# Patient Record
Sex: MALE | Race: WHITE | Employment: OTHER | URBAN - METROPOLITAN AREA
[De-identification: names, ages, dates, MRNs, and addresses within clinical notes are randomized per-mention and may not be internally consistent; named-entity substitution may affect disease eponyms.]

---

## 2019-04-02 ENCOUNTER — TRANSFERRED RECORDS (OUTPATIENT)
Dept: HEALTH INFORMATION MANAGEMENT | Facility: CLINIC | Age: 69
End: 2019-04-02

## 2019-04-03 ENCOUNTER — TRANSFERRED RECORDS (OUTPATIENT)
Dept: HEALTH INFORMATION MANAGEMENT | Facility: CLINIC | Age: 69
End: 2019-04-03

## 2019-04-04 ENCOUNTER — TRANSFERRED RECORDS (OUTPATIENT)
Dept: HEALTH INFORMATION MANAGEMENT | Facility: CLINIC | Age: 69
End: 2019-04-04

## 2019-04-05 ENCOUNTER — TRANSFERRED RECORDS (OUTPATIENT)
Dept: HEALTH INFORMATION MANAGEMENT | Facility: CLINIC | Age: 69
End: 2019-04-05

## 2019-04-05 ENCOUNTER — HOSPITAL ENCOUNTER (INPATIENT)
Facility: CLINIC | Age: 69
LOS: 2 days | Discharge: HOME | DRG: 433 | End: 2019-04-07
Attending: INTERNAL MEDICINE | Admitting: INTERNAL MEDICINE
Payer: COMMERCIAL

## 2019-04-05 DIAGNOSIS — K92.0 GASTROINTESTINAL HEMORRHAGE WITH HEMATEMESIS: Primary | ICD-10-CM

## 2019-04-05 PROBLEM — K92.2 GIB (GASTROINTESTINAL BLEEDING): Status: ACTIVE | Noted: 2019-04-05

## 2019-04-05 LAB
ABO + RH BLD: NORMAL
ABO + RH BLD: NORMAL
ALT SERPL-CCNC: 38 IU/L (ref 12–68)
AST SERPL-CCNC: 38 IU/L (ref 12–37)
BLD GP AB SCN SERPL QL: NORMAL
BLOOD BANK CMNT PATIENT-IMP: NORMAL
CREAT SERPL-MCNC: 1.14 MG/DL (ref 0.7–1.3)
GFR SERPL CREATININE-BSD FRML MDRD: >60 ML/MIN/1.73M2
GLUCOSE SERPL-MCNC: 106 MG/DL (ref 74–106)
HGB BLD-MCNC: 7.6 G/DL (ref 13.3–17.7)
INR PPP: 1.5 (ref 1–1)
POTASSIUM SERPL-SCNC: 3.5 MMOL/L (ref 3.5–5.1)
SPECIMEN EXP DATE BLD: NORMAL

## 2019-04-05 PROCEDURE — 86850 RBC ANTIBODY SCREEN: CPT

## 2019-04-05 PROCEDURE — 25800030 ZZH RX IP 258 OP 636

## 2019-04-05 PROCEDURE — 86900 BLOOD TYPING SEROLOGIC ABO: CPT

## 2019-04-05 PROCEDURE — 12000004 ZZH R&B IMCU UMMC

## 2019-04-05 PROCEDURE — C9113 INJ PANTOPRAZOLE SODIUM, VIA: HCPCS

## 2019-04-05 PROCEDURE — 99223 1ST HOSP IP/OBS HIGH 75: CPT | Mod: AI | Performed by: INTERNAL MEDICINE

## 2019-04-05 PROCEDURE — 86901 BLOOD TYPING SEROLOGIC RH(D): CPT

## 2019-04-05 PROCEDURE — 36415 COLL VENOUS BLD VENIPUNCTURE: CPT

## 2019-04-05 PROCEDURE — 85018 HEMOGLOBIN: CPT

## 2019-04-05 PROCEDURE — 25000128 H RX IP 250 OP 636

## 2019-04-05 PROCEDURE — 40000556 ZZH STATISTIC PERIPHERAL IV START W US GUIDANCE

## 2019-04-05 PROCEDURE — 25000132 ZZH RX MED GY IP 250 OP 250 PS 637

## 2019-04-05 RX ORDER — LIDOCAINE 40 MG/G
CREAM TOPICAL
Status: DISCONTINUED | OUTPATIENT
Start: 2019-04-05 | End: 2019-04-07 | Stop reason: HOSPADM

## 2019-04-05 RX ORDER — NALOXONE HYDROCHLORIDE 0.4 MG/ML
.1-.4 INJECTION, SOLUTION INTRAMUSCULAR; INTRAVENOUS; SUBCUTANEOUS
Status: DISCONTINUED | OUTPATIENT
Start: 2019-04-05 | End: 2019-04-07 | Stop reason: HOSPADM

## 2019-04-05 RX ORDER — ACETAMINOPHEN 325 MG/1
325 TABLET ORAL EVERY 6 HOURS PRN
Status: DISCONTINUED | OUTPATIENT
Start: 2019-04-05 | End: 2019-04-07 | Stop reason: HOSPADM

## 2019-04-05 RX ORDER — PROPRANOLOL HYDROCHLORIDE 10 MG/1
10 TABLET ORAL 2 TIMES DAILY
Status: DISCONTINUED | OUTPATIENT
Start: 2019-04-05 | End: 2019-04-07 | Stop reason: HOSPADM

## 2019-04-05 RX ORDER — ALBUTEROL SULFATE 90 UG/1
2 AEROSOL, METERED RESPIRATORY (INHALATION) EVERY 4 HOURS PRN
Status: DISCONTINUED | OUTPATIENT
Start: 2019-04-05 | End: 2019-04-07 | Stop reason: HOSPADM

## 2019-04-05 RX ORDER — CIPROFLOXACIN 2 MG/ML
400 INJECTION, SOLUTION INTRAVENOUS EVERY 12 HOURS
Status: DISCONTINUED | OUTPATIENT
Start: 2019-04-05 | End: 2019-04-06

## 2019-04-05 RX ADMIN — PROPRANOLOL HYDROCHLORIDE 10 MG: 10 TABLET ORAL at 21:06

## 2019-04-05 RX ADMIN — PANTOPRAZOLE SODIUM 40 MG: 40 INJECTION, POWDER, FOR SOLUTION INTRAVENOUS at 21:06

## 2019-04-05 RX ADMIN — OCTREOTIDE ACETATE 50 MCG/HR: 200 INJECTION, SOLUTION INTRAVENOUS; SUBCUTANEOUS at 23:12

## 2019-04-05 SDOH — HEALTH STABILITY: MENTAL HEALTH: HOW MANY STANDARD DRINKS CONTAINING ALCOHOL DO YOU HAVE ON A TYPICAL DAY?: 3 OR 4

## 2019-04-05 SDOH — HEALTH STABILITY: MENTAL HEALTH: HOW OFTEN DO YOU HAVE A DRINK CONTAINING ALCOHOL?: 4 OR MORE TIMES A WEEK

## 2019-04-05 ASSESSMENT — ACTIVITIES OF DAILY LIVING (ADL)
TRANSFERRING: 0-->INDEPENDENT
COGNITION: 0 - NO COGNITION ISSUES REPORTED
ADLS_ACUITY_SCORE: 19
AMBULATION: 0-->INDEPENDENT
RETIRED_COMMUNICATION: 0-->UNDERSTANDS/COMMUNICATES WITHOUT DIFFICULTY
BATHING: 0-->INDEPENDENT
SWALLOWING: 0-->SWALLOWS FOODS/LIQUIDS WITHOUT DIFFICULTY
TOILETING: 0-->INDEPENDENT
FALL_HISTORY_WITHIN_LAST_SIX_MONTHS: NO
RETIRED_EATING: 0-->INDEPENDENT
DRESS: 0-->INDEPENDENT

## 2019-04-05 ASSESSMENT — MIFFLIN-ST. JEOR: SCORE: 1692.07

## 2019-04-05 NOTE — PROGRESS NOTES
Admission          4/5/2019  6:35 PM  -----------------------------------------------------------  Reason for admission: GI bleed  Primary team notified of pt arrival.  Admitted from: Outside hospital   Via: stretcher  Accompanied by: family  Belongings: Placed in closet; valuables sent home with family  Admission Profile: complete  Teaching: orientation to unit and call light- call light within reach, call don't fall, use of console, meal times, when to call for the RN, and enforced importance of safety   Access: PIV  Telemetry:Placed on pt  Ht./Wt.: complete  2 RN Skin Assessment Completed By: AT and NL  Pt status: Full - VSS - A&Ox4    Temp:  [98.2  F (36.8  C)] 98.2  F (36.8  C)  Heart Rate:  [69] 69  Resp:  [16] 16  BP: (161)/(90) 161/90  SpO2:  [98 %] 98 %

## 2019-04-05 NOTE — PROGRESS NOTES
[facility]  Transfer Triage Note    Date of call: 04/05/19  Time of call: 12:06 PM    Reason for Transfer:Procedure can be done here and not at referring hospital  Diagnosis: variceal bleed    Outside Records: Available  Additional records requested to be faxed to [phone].    Stability of Patient: Patient is at risk for instability, however patient requires urgent transfer and does not meet ICU criteria     Expected Time of Arrival for Transfer: 0-8 hours    Recommendations for Management and Stabilization: Not needed    Additional Comments 68M currently admitted to [facility] with GIB. Hx of ETOH cirrhosis, had dark tarry stool, Hb10->6 EGD there showed eso and Gastoric varices w recent sign of bleed. Was unable to successfully treat and IR there attempted embolization. However, found to have splenic v occlusion and could not do it. Surgery there was consulted for possible splenectomy but felt to be too high risk. They request transfer for advanced care such as glue or coiling.  Hemodynamically much more stable now.     Dr Rueda from hepatology was on the call and agreed w transfer.  [facility] bed requested    Alycia Gomez MD

## 2019-04-06 ENCOUNTER — APPOINTMENT (OUTPATIENT)
Dept: ULTRASOUND IMAGING | Facility: CLINIC | Age: 69
DRG: 433 | End: 2019-04-06
Attending: INTERNAL MEDICINE
Payer: COMMERCIAL

## 2019-04-06 LAB
ALBUMIN SERPL-MCNC: 2.7 G/DL (ref 3.4–5)
ALBUMIN SERPL-MCNC: 2.9 G/DL (ref 3.4–5)
ALP SERPL-CCNC: 64 U/L (ref 40–150)
ALP SERPL-CCNC: 68 U/L (ref 40–150)
ALT SERPL W P-5'-P-CCNC: 32 U/L (ref 0–70)
ALT SERPL W P-5'-P-CCNC: 32 U/L (ref 0–70)
ANION GAP SERPL CALCULATED.3IONS-SCNC: 7 MMOL/L (ref 3–14)
ANION GAP SERPL CALCULATED.3IONS-SCNC: 8 MMOL/L (ref 3–14)
AST SERPL W P-5'-P-CCNC: 30 U/L (ref 0–45)
AST SERPL W P-5'-P-CCNC: 32 U/L (ref 0–45)
BILIRUB SERPL-MCNC: 2.1 MG/DL (ref 0.2–1.3)
BILIRUB SERPL-MCNC: 2.4 MG/DL (ref 0.2–1.3)
BUN SERPL-MCNC: 12 MG/DL (ref 7–30)
BUN SERPL-MCNC: 13 MG/DL (ref 7–30)
CALCIUM SERPL-MCNC: 7.5 MG/DL (ref 8.5–10.1)
CALCIUM SERPL-MCNC: 7.8 MG/DL (ref 8.5–10.1)
CHLORIDE SERPL-SCNC: 108 MMOL/L (ref 94–109)
CHLORIDE SERPL-SCNC: 109 MMOL/L (ref 94–109)
CO2 SERPL-SCNC: 20 MMOL/L (ref 20–32)
CO2 SERPL-SCNC: 22 MMOL/L (ref 20–32)
CREAT SERPL-MCNC: 1.06 MG/DL (ref 0.66–1.25)
CREAT SERPL-MCNC: 1.1 MG/DL (ref 0.66–1.25)
ERYTHROCYTE [DISTWIDTH] IN BLOOD BY AUTOMATED COUNT: 15.9 % (ref 10–15)
ERYTHROCYTE [DISTWIDTH] IN BLOOD BY AUTOMATED COUNT: 16.3 % (ref 10–15)
FIBRINOGEN PPP-MCNC: 239 MG/DL (ref 200–420)
GFR SERPL CREATININE-BSD FRML MDRD: 69 ML/MIN/{1.73_M2}
GFR SERPL CREATININE-BSD FRML MDRD: 72 ML/MIN/{1.73_M2}
GLUCOSE SERPL-MCNC: 101 MG/DL (ref 70–99)
GLUCOSE SERPL-MCNC: 99 MG/DL (ref 70–99)
HCT VFR BLD AUTO: 23.5 % (ref 40–53)
HCT VFR BLD AUTO: 26.2 % (ref 40–53)
HGB BLD-MCNC: 7.2 G/DL (ref 13.3–17.7)
HGB BLD-MCNC: 7.7 G/DL (ref 13.3–17.7)
HGB BLD-MCNC: 7.8 G/DL (ref 13.3–17.7)
HGB BLD-MCNC: 8.1 G/DL (ref 13.3–17.7)
INR PPP: 1.46 (ref 0.86–1.14)
INR PPP: 1.52 (ref 0.86–1.14)
MCH RBC QN AUTO: 30.1 PG (ref 26.5–33)
MCH RBC QN AUTO: 30.2 PG (ref 26.5–33)
MCHC RBC AUTO-ENTMCNC: 30.6 G/DL (ref 31.5–36.5)
MCHC RBC AUTO-ENTMCNC: 30.9 G/DL (ref 31.5–36.5)
MCV RBC AUTO: 98 FL (ref 78–100)
MCV RBC AUTO: 98 FL (ref 78–100)
PLATELET # BLD AUTO: 38 10E9/L (ref 150–450)
PLATELET # BLD AUTO: 48 10E9/L (ref 150–450)
POTASSIUM SERPL-SCNC: 3.6 MMOL/L (ref 3.4–5.3)
POTASSIUM SERPL-SCNC: 3.7 MMOL/L (ref 3.4–5.3)
PROT SERPL-MCNC: 5.5 G/DL (ref 6.8–8.8)
PROT SERPL-MCNC: 5.9 G/DL (ref 6.8–8.8)
RBC # BLD AUTO: 2.39 10E12/L (ref 4.4–5.9)
RBC # BLD AUTO: 2.68 10E12/L (ref 4.4–5.9)
SODIUM SERPL-SCNC: 137 MMOL/L (ref 133–144)
SODIUM SERPL-SCNC: 138 MMOL/L (ref 133–144)
WBC # BLD AUTO: 5 10E9/L (ref 4–11)
WBC # BLD AUTO: 6 10E9/L (ref 4–11)

## 2019-04-06 PROCEDURE — 25000128 H RX IP 250 OP 636

## 2019-04-06 PROCEDURE — 25000128 H RX IP 250 OP 636: Performed by: STUDENT IN AN ORGANIZED HEALTH CARE EDUCATION/TRAINING PROGRAM

## 2019-04-06 PROCEDURE — 25000128 H RX IP 250 OP 636: Performed by: INTERNAL MEDICINE

## 2019-04-06 PROCEDURE — 85610 PROTHROMBIN TIME: CPT | Performed by: INTERNAL MEDICINE

## 2019-04-06 PROCEDURE — 85027 COMPLETE CBC AUTOMATED: CPT

## 2019-04-06 PROCEDURE — 99233 SBSQ HOSP IP/OBS HIGH 50: CPT | Performed by: HOSPITALIST

## 2019-04-06 PROCEDURE — 36415 COLL VENOUS BLD VENIPUNCTURE: CPT | Performed by: INTERNAL MEDICINE

## 2019-04-06 PROCEDURE — 85018 HEMOGLOBIN: CPT | Performed by: STUDENT IN AN ORGANIZED HEALTH CARE EDUCATION/TRAINING PROGRAM

## 2019-04-06 PROCEDURE — 25000132 ZZH RX MED GY IP 250 OP 250 PS 637

## 2019-04-06 PROCEDURE — 85384 FIBRINOGEN ACTIVITY: CPT

## 2019-04-06 PROCEDURE — 36415 COLL VENOUS BLD VENIPUNCTURE: CPT | Performed by: STUDENT IN AN ORGANIZED HEALTH CARE EDUCATION/TRAINING PROGRAM

## 2019-04-06 PROCEDURE — 36415 COLL VENOUS BLD VENIPUNCTURE: CPT

## 2019-04-06 PROCEDURE — 76705 ECHO EXAM OF ABDOMEN: CPT

## 2019-04-06 PROCEDURE — C9113 INJ PANTOPRAZOLE SODIUM, VIA: HCPCS

## 2019-04-06 PROCEDURE — 85610 PROTHROMBIN TIME: CPT

## 2019-04-06 PROCEDURE — 80053 COMPREHEN METABOLIC PANEL: CPT

## 2019-04-06 PROCEDURE — 12000004 ZZH R&B IMCU UMMC

## 2019-04-06 PROCEDURE — 85027 COMPLETE CBC AUTOMATED: CPT | Performed by: INTERNAL MEDICINE

## 2019-04-06 PROCEDURE — 25800030 ZZH RX IP 258 OP 636: Performed by: INTERNAL MEDICINE

## 2019-04-06 PROCEDURE — 80053 COMPREHEN METABOLIC PANEL: CPT | Performed by: INTERNAL MEDICINE

## 2019-04-06 RX ORDER — PROPRANOLOL HYDROCHLORIDE 10 MG/1
10 TABLET ORAL 2 TIMES DAILY
Qty: 60 TABLET | Refills: 3 | Status: CANCELLED | OUTPATIENT
Start: 2019-04-06

## 2019-04-06 RX ORDER — PANTOPRAZOLE SODIUM 40 MG/1
40 TABLET, DELAYED RELEASE ORAL
Qty: 30 TABLET | Refills: 3 | Status: CANCELLED | OUTPATIENT
Start: 2019-04-07

## 2019-04-06 RX ORDER — CEFTRIAXONE 1 G/1
1 INJECTION, POWDER, FOR SOLUTION INTRAMUSCULAR; INTRAVENOUS EVERY 24 HOURS
Status: DISCONTINUED | OUTPATIENT
Start: 2019-04-06 | End: 2019-04-07 | Stop reason: HOSPADM

## 2019-04-06 RX ORDER — PANTOPRAZOLE SODIUM 40 MG/1
40 TABLET, DELAYED RELEASE ORAL
Status: DISCONTINUED | OUTPATIENT
Start: 2019-04-07 | End: 2019-04-07 | Stop reason: HOSPADM

## 2019-04-06 RX ADMIN — PROPRANOLOL HYDROCHLORIDE 10 MG: 10 TABLET ORAL at 08:25

## 2019-04-06 RX ADMIN — CIPROFLOXACIN 400 MG: 2 INJECTION, SOLUTION INTRAVENOUS at 00:13

## 2019-04-06 RX ADMIN — CEFTRIAXONE 1 G: 1 INJECTION, POWDER, FOR SOLUTION INTRAMUSCULAR; INTRAVENOUS at 14:28

## 2019-04-06 RX ADMIN — PHYTONADIONE 10 MG: 10 INJECTION, EMULSION INTRAMUSCULAR; INTRAVENOUS; SUBCUTANEOUS at 02:46

## 2019-04-06 RX ADMIN — PANTOPRAZOLE SODIUM 40 MG: 40 INJECTION, POWDER, FOR SOLUTION INTRAVENOUS at 08:24

## 2019-04-06 RX ADMIN — PROPRANOLOL HYDROCHLORIDE 10 MG: 10 TABLET ORAL at 20:25

## 2019-04-06 ASSESSMENT — ACTIVITIES OF DAILY LIVING (ADL)
ADLS_ACUITY_SCORE: 10

## 2019-04-06 ASSESSMENT — MIFFLIN-ST. JEOR: SCORE: 1688.44

## 2019-04-06 NOTE — H&P
[facility] [place]  History and Physical - [employer] [facility]   Date of Admission:  4/5/2019    Assessment & Plan   Varghese Whitfield is a 68 year old male admitted on 4/5/2019. He has a PMH Asthma, HTN, EtOH Cirrhosis w/ gastric varicies, barrents esophagus, hx GIB in 2013, ongoing EtOH use and is transferred here for advanced endoscopic intervention for gastric variceal bleed.      Upper GIB:   Splenomegaly/splenic vein occlusion:  Cirrhosis presumed 2/2 EtOH: MELD 22 on 4/1, decreased to 15 prior to transfer.    c/b portal HTN, varices, trace ascites, coagulopathy.  Has history of upper GIB in 2013.  At OSH was started on appropriate treatment.  Had EGD with gastric and esophageal varices and stigmata of bleeding, but no active bleed.  Now transferred here for planned endoscopic intervention.  Hemodynamically stable. Hgb prior to transfer 8.4.  Will trend and monitor closely while awaiting GI consult.    - GI consult placed    Clear liquid diet    NPO midnight  - Continue IV PPI, octreotide, ciprofloxacin (has penicillin allergy, generalized rash, no breathing issues)  - Type and screen ordered  - Maintain 2 large bore PIVs  - Consented for blood  - Trending hgb Q6 overnight  - Transfuse for HGB <7  - Daily meld labs    UPDATE: repeat hgb 7.6 and platelets 28 per nursing (not yet updated in chart).    - Will trend HGB and platelets.    - Ordered fibrinogen and INR  - If concerned for bleeding or DIC will need tranfusion  - With increasing abd distention and above labs will repeat CMP and ordered RUQ us with doppler to eval for new clot    Ongoing EtOH use:  Possible hx withdrawal seizure:  Last drink 4/1.  Drinks heavily daily.  He denies history of withdrawal but as it is noted in the chart will monitor closely, although he is nearing the end of the window during which I would be most concerned.  CIWA negative at OSH 4/2-5.    - CIWA  - Defer ordering meds at this time, call MD if  concerned     Lower extremity edema: Suspect degree of volume overload with fluids at OSH and holding PTA diuretic.    - Continue to hold PTA diuretic pending repeat lab and hemodynamic stability  - Would plan to resume soon    Paroxysmal Afib at OSH: [id number] 2 for age and HTN.  Returned to NSR prior to transfer.  And trop was trended to peak, felt to have been likely due to demand.  No chest pain or SOB currently.    - Obtain repeat ECG if Afib  - Telemetry    SRIDHAR: Resolved.  Creatine 1.44 at admission on 4/2, has now normalized as of 4/5 am.  - Repeat labs in the AM    Lactate elevation: Lactate elevated on admission 4/1, decreased with fluids.   - NTD    Chronic Issues:  HTN: Holding hydrochlorothiazide/lisinopril in setting of recent SRIDHAR and GIB  Asthma: PTA PRN inhaler available  Peripheral Neuropathy?: Holding PTA gabapentin     Diet: NPO for Medical/Clinical Reasons Except for: Meds, Ice Chips    Fluids: None at this time  DVT Prophylaxis: SCDs  Montez Catheter: not present  Code Status: DNR/DNI      Disposition Plan   Expected discharge: 2 - 3 days, recommended to prior living arrangement once hemoglobin stable.  Entered: Dave Livingston MD 04/05/2019, 9:05 PM       The patient's care was discussed with the Attending Physician, Dr. Thomas.    Dave Livingston MD  [facility], [place]  Pager: [phone]  Please see sticky note for cross cover information  ______________________________________________________________________    Chief Complaint   GIB.  Transferred for advanced endoscopic intervention.      History is obtained from the patient    History of Present Illness   Varghese Whitfield is a 68 year old male with PMHx Asthma, HTN, EtOH Cirrhosis w/ gastric varicies, barrents esophagus, hx GIB in 2013, ongoing EtOH use. He is on propanolol and protonix.     Who presents on transfer from OSH for planned advanced endoscopic intervention for bleeding  gastric varices.      Presented to OSH 4/2 with CC hematemesis and melena:     Monday 4/1 he felt nauseated and had several episodes of hematemesis.  Had been drinking heavily over the weekend.  Blood was clotted with adonay blood mixed in and he is not exactly sure how much there was but it seemed like it was increasing.  He had been noted intermittent dark stools leading up to this but not black or frankly bloody stools.  He does not remember feeling short of breath or lightheaded.    He presented to the ER and was noted to by borderline hypotensive and tachycardic.  Initial labs were notable for WBC 10.9, HGB 10. 5, platelet 99.  Had MELD 23 with , creatine 1.44, TBili 3.2, INR 1.6.  Had elevated lactate 4.9 which down trended with fluids.    Troponin elevated on admission to 0.048, peaked at 0.1 and though to have been due to demand.  Additionally he complained of palpitations and was noted to be in Afib with RVR ([id number]).       He was given vitamin K, started on IV PPI, and octreotide, and ciprofloxacin for SBP prophylaxis and admitted for further intervention.  Hgb dropped to 6.5 4/2, with continue hematemesis and melena.  Had EGD 4/2 which showed small proximal esophageal and gastric varices with stigmata of recent bleeding, but no active bleed.  IR was consulted and deemed he was not candidate for balloon-occluded retrograde transvenous obliteration due to splenic vein occlusion.      Prior to transfer hgb was 8.4.      On arrival he says he feels nearly back to normal with no fevers/chills, shortness of breath, lightheadedness, abd pain, nausea/vomiting, or melena/hematochezia today.  Would like to eat dinner.  Does note pitting edema at the ankles bilaterally, which he does not think is normal for him.      This has happened once in the past in 2013 and he is on propanolol and protonix and [facility].  Has had colonoscopy several years ago, he  thinks it was clean.  Denies NSAID use (although has indomethacin and diclofenac gel on med list, does not think he is using them), Not on anticoagulation.  Tries to stay sober, but frequently relapses.  Had been drinking as much as 1-2 boxes of wine per day.  Last drink 5PM 3/31.  On my exam he denies history of withdrawal but there are notes which mention a remote withdrawal seizure.  He say they go to [place] annually and he does not drink for 2 months without issue.      Review of Systems    The 10 point Review of Systems is negative other than noted in the HPI or here.     Past Medical History    I have reviewed this patient's medical history and updated it with pertinent information if needed.   Past Medical History:   Diagnosis Date     Asthma      Cirrhosis with alcoholism (H)      HTN (hypertension)    Does not think he has ever had pancreatitis      Past Surgical History   I have reviewed this patient's surgical history and updated it with pertinent information if needed.  Past Surgical History:   Procedure Laterality Date     septum      repaired for deviation      Social History   I have reviewed this patient's social history and updated it with pertinent information if needed. Varghese Whitfield  reports that  has never smoked. He does not have any smokeless tobacco history on file. He reports that he drinks alcohol. He reports that he does not use drugs.    Family History   I have reviewed this patient's family history and updated it with pertinent information if needed.   Family History   Problem Relation Age of Onset     Myocardial Infarction Mother      Myocardial Infarction Father      Alcoholism Father      No Known Problems Sister         History of hemochomatosis, a1at def, or other liver disease     Prior to Admission Medications   None     Allergies   No Known Allergies    Physical Exam   Vital Signs: Temp: 98.2  F (36.8  C) Temp src: Oral BP: 159/85   Heart Rate: 69 Resp: 16 SpO2: 98 % O2 Device:  None (Room air)    Weight: 200 lbs 12.8 oz    GEN: Well nourished, well developed, appears stated age  HEENT: PERRL, no conjunctival injection, mild icterus and jaundice, Neck supple without meningismus, no anterior cervical or supraclavicular LAD  CV: RRR, Warm, well-perfused extremities, no JVD  RESP: CTAB, although decreased at the bases, no crackles, normal WOB  GI: Mild gynecomastia.  Abd soft, non-tender, distended, + shifting dullness, splenomegaly with sleep palpable 5 cm below costal margin.    MSK: No gross deformities appreciated, no pitting edema to the knees bilaterally, decreased hair on th legs.    Skin: Warm, dry. No rashes/lesions  Neuro: Alert, CNs II-XII grossly intact. Sensation and motor function of extremities grossly intact.   Psych: Appropriate mood and affect.    Data   Data reviewed today: I reviewed all medications, new labs and imaging results over the last 24 hours.

## 2019-04-06 NOTE — CONSULTS
INTERVENTIONAL RADIOLOGY CONSULT    Varghese Whitfield is a 68 year old male admitted on 4/5/2019 for gastric variceal bleeding. We were consulted for TIPS placement. We went up to the floor and saw the patient at bedside.     He has a PMH Asthma, HTN, EtOH Cirrhosis w/ gastric varicies. He was transferred here from [facility] for GI bleeding.     He gets his care from [facility]. He does  Not have a hepatologist he sees. He is currently hemodynamically stable.      The patient's MELD score is 15. He is therefore a suitable candidate for a TIPS procedure.     I discussed with the patient what a TIPS procedure is and showed them pictures of the stent and the way we join a hepatic and portal vein. I explained why the procedure is performed and how it decompresses the portal venous system. We discussed the risks of the procedure, which include but are not limited to infection such as liver abscess, biloma formation, damage to nearby vessels or bile ducts, bleeding, failure of the procedure, stent maldeployment, liver failure, right heart failure, and encephalopathy.     I discussed that the procedure will be performed under general anaesthesia.     I explained she will be followed with regular TIPS ultrasounds due to the risk of narrowing or thrombosis within the stent over time.     I informed the patient that I will be assisted during the procedure and that this likely will include a resident and/or fellow. All of the patient's questions were answered.    He prefers TIPS to be done as outpatient. We will see the patient in clinic after he discharges. We will also have see PACS clinic and also transfer his [facility] from [facility] to our PACS.     Davian Quiñones MD  Interventional Radiology Fellow  [phone] after hours    I, Dr Gabe Wei, was present with the fellow during the history and exam. I discussed the case with the fellow and agree with the findings as documented in the assessment and  plan.    Gabe Wei MD

## 2019-04-06 NOTE — PROGRESS NOTES
Event Note:    Patient stated he wished to leave AMA.  Was frustrated with continued testing and perceived delay in intervention.  He granted me permission to call his wife and she talked him into staying to hear what GI has to say in the AM.      While this was happening labs returned with interval hgb drop and decreased platelets.  Further enforcing the importance of staying for close monitoring.  Patient agreed to stay and amiable with staff.  Apologized for his previous frustration.

## 2019-04-06 NOTE — PROGRESS NOTES
Time of notification: 1:36 PM  Provider notified: Karli Artis [id number]  Patient status:Checking if you/pharmacy want Rocephin administered? (penicillin allergy HX)  Temp:  [97.7  F (36.5  C)-98.4  F (36.9  C)] 98  F (36.7  C)  Heart Rate:  [66-82] 70  Resp:  [16-18] 18  BP: (147-168)/() 156/69  SpO2:  [94 %-100 %] 100 %  Orders received: Awaiting orders. Spoke with intern Karli who communicated with pharmacist Tamera. Patient care order placed to administer  Rocephin d/t low risk allergic reaction.

## 2019-04-06 NOTE — PROGRESS NOTES
Intern: Karli Artis ([id number]) in room updated patient on current plan. Advanced to Clear liquid diet. Plan for T.I.P.S procedure, IR consulted (Date undetermined). During conversation patient reported allergy to Penicillin. Reported reaction was hives and itching and occurrence happened around 17+ years ago. Allergy list updated. Intern Karli to talk to pharmacy and determine continuation of Rocephin. With holding medication until further instruction from Karli.

## 2019-04-06 NOTE — PLAN OF CARE
Neuro: A&Ox4.   Cardiac: SR. VSS.   Respiratory: Sating 95%+ on RA.  GI/: Adequate urine output. BM X2  Diet/appetite: Tolerating clear diet. Eating well. Would like to advance. (provider contacted to determine appropriate diet)  Activity:  Up ad jess in room when off IV pole  Pain: Reports no pain   Skin: No new deficits noted.  LDA's: 2 PIV R forearm SL    Plan: Continue with POC. Notify primary team with changes.     Patient and family spoke in length with IR doctor regarding T.I.P.S. There is a potential for patient to have this done as an outpatient procedure.

## 2019-04-06 NOTE — PLAN OF CARE
Neuro: A&Ox4. CIWA q4h- scored 0. Attempted to leave AMA- wife convinced patient to stay  Cardiac: SR. HR 70-80s. VSS. Afebrile   Respiratory: Sating >95% on RA.  GI/: Adequate urine output.   Diet/appetite: Tolerating NPO diet.   Activity:  Assist of standby, up to chair and in halls.  Pain: Patient denies pain  Skin: No new deficits noted.  LDA's: R PIV x2- octreotide infusing    Plan: Continue with POC. Notify primary team with changes. GI to see patient today

## 2019-04-06 NOTE — PROGRESS NOTES
[facility], [place]    Internal Medicine Progress Note - [facility] Service    Main Plans for Today   - GI consult  - IR consult and evaluation for TIPS  - Hemoglobin and platelet checks Q12H  - Advance diet as tolerated.    Assessment & Plan   Varghese Whitfield is a 68 year old male admitted on 4/5/2019 as a transfer in from [facility]. He has a PMH Asthma, HTN, EtOH Cirrhosis w/ gastric varicies, barrents esophagus, hx GIB in 2013, ongoing EtOH use and is transferred here for advanced endoscopic intervention for gastric variceal bleed.       Upper GIB:   Splenomegaly/splenic vein occlusion:  Cirrhosis presumed 2/2 EtOH: MELD 22 on 4/1, decreased to 15 prior to transfer.   Cirrhosis c/b portal HTN, varices, trace ascites, coagulopathy. Has history of upper GIB in 2013.  At OSH was started on appropriate treatment. Had EGD with gastric and esophageal varices and stigmata of bleeding, but no active bleed.  Now transferred here for planned endoscopic intervention. Hemodynamically stable. Hgb prior to transfer 8.4. S/p type and screen ordered. Consented for blood transfusion.  Abdominal Ultrasound- Cirrhotic liver, moderate ascites, patent hepatic vasculature. No evidence of portal vein thrombosis. No concern for encephalopathy at this time. No concern for DIC at this time- noted normal fibrinogen.  - Maintain 2 large bore PIVs  -Trend hgb and platelets Q12H  - Transfuse for HGB <7   - GI consult, appreciate input  - IR consult placed for TIPS per GI  - Continue IV PPI, octreotide  - IV ceftriaxone 1g O.D for SBP prophylaxis in setting of GI bleeding-( checked in pharmacist -Tamera-- low risk of cross reactivity with penicillins- will monitor)  - Daily meld labs      Ongoing EtOH use:  Possible hx withdrawal seizure:  Last drink 4/1.  Drinks heavily daily.  He denies history of withdrawal but as it is noted in the chart will monitor closely, although he is nearing the end of the window  during which I would be most concerned.  CIWA negative at OSH 4/2-5.    - CIWA  - Defer ordering meds at this time, call MD if concerned      Lower extremity edema: Suspect degree of volume overload with fluids at OSH and holding PTA diuretic.    - Continue to hold PTA diuretic pending repeat lab and hemodynamic stability  - Would plan to resume soon     Paroxysmal Afib at OSH: [id number] 2 for age and HTN.  Returned to NSR prior to transfer.  And trop was trended to peak, felt to have been likely due to demand.  No chest pain or SOB currently.    - Obtain repeat ECG if Afib  - Telemetry     SRIDHAR: Resolved.  Creatine 1.44 at admission on 4/2, has now normalized as of 4/5 am.  -Monitor     Lactate elevation: Lactate elevated on admission 4/1, decreased with fluids.   - NTD     Chronic Issues:  HTN: Holding hydrochlorothiazide/lisinopril in setting of recent SRIDHAR and GIB. Will consider restarting it tomorrow.  Asthma: PTA PRN inhaler available  Peripheral Neuropathy: continue holding PTA gabapentin     Diet: NPO for Medical/Clinical Reasons Except for: Meds, Ice Chips    Fluids: None at this time  DVT Prophylaxis: SCDs  Montez Catheter: not present  Code Status: DNR/DNI      Disposition Plan   Expected discharge: 2 - 3 days, recommended to prior living arrangement once cleared by GI.       Entered: Karli Artis 04/06/2019, 6:51 AM   Information in the above section will display in the discharge planner report.      The patient's care was discussed with the Attending Physician, Dr. Reyes.    Karli Artis  [facility]: PGY-2  Pager: [phone]  Please see sticky note for cross cover information    Interval History   No acute events overnight.  No hematemesis or melena or lightheadedness. No fevers.  Denies any symptoms today  Vitals stable.    Physical Exam   Vital Signs: Temp: 98.4  F (36.9  C) Temp src: Oral BP: 157/69   Heart Rate: 68 Resp: 16 SpO2: 98 % O2 Device: None  (Room air)    Weight: 200 lbs 0 oz  General Appearance: AOx3, no clubbing/cyanosis, no edema, no JVD  Respiratory: CTAB, no  wheezing, no crackles  Cardiovascular: RRR, normal S1/S2, no murmur  GI: no distension, BS+, not tender, no peritoneal signs  Skin: no rash  Neuro: CN grossly intact, no focal neurological deficits

## 2019-04-06 NOTE — CONSULTS
GASTROENTEROLOGY CONSULTATION      Date of Admission:  4/5/2019          ASSESSMENT AND RECOMMENDATIONS:   Assessment:  Varghese Whitfield is a 68 year old male with a past medical history of decompensated EtOH cirrhosis with active alcohol use, hypertension, asthma who is admitted as transfer from [facility] for management of gastric varices s/p hemorrhage.    1. Decompensated EtOH Cirrhosis  2. UGIB 2/2 Gastric Variceal Hemorrhage; no bleeding since 4/1/19  3. Alcohol Abuse   - Hepatic Encephalpathy: Not currently present.    - Esophageal Varices: History of.        *Most recent endoscopy: 4/2/19 - as below   - Ascites: Noted on imaging.    - SBP: No history of.     - Coagulopathy: Present.    - Immunity: HAV/HBV/HCV non-reactive in 2008   - Hepatoma: None on U/S Abdomen 4/5/19   - Transplant Evaluation: Active alcohol abuse - not candidate at this time.     MELD-Na: 15.     Recommendations:   - IR consultation - plan to follow-up for TIPS as outpatient (Monday clinic visit)   - Establish hepatology care at [facility]   - Absolute alcohol cessation    - Ok to stop octreotide infusion    - Complete 7 day course of antibiotics for GIB in cirrhotic   - Ok to discharge from hepatology perspective   - Return to [facility] if recurrent bleeding immediately   - Gastroenterology team will continue to follow with you.    Gastroenterology follow up recommendations: Establish care with [facility].     Thank you for involving us in this patient's care. Please do not hesitate to contact the GI service with any questions or concerns.     Pt care plan discussed with Dr. Rueda, GI staff physician.    Valentina Negron MD  Gastroenterology/Hepatology Fellow  PGY-5, [id number]  -------------------------------------------------------------------------------------------------------------------          Chief Complaint:   We were asked by Dr. Gomez of Internal Medicine to evaluate this patient with esophageal/gastric variceal  bleeding.    History is obtained from the patient and the medical record.          History of Present Illness:   Varghese Whitfield is a 68 year old male with a past medical history of decompensated EtOH cirrhosis with active alcohol use, hypertension, asthma who is admitted as transfer from [facility] for management of gastric varices s/p hemorrhage.    Patient reports he was in his usual state of health until Monday, 4/1 when he developed nausea and had 8 episodes of hematemesis. The first two episodes were quite significant and there was significantly less blood thereafter. He has not vomited since Monday. He denies chest pain, shortness of breath, fevers, chills, confusion, jaundice. He has had minimal bowel movements as he has been NPO, but they are yellow. No hematochezia or melena.     In the outside hospital ED he was found to have hemoglobin of 10.5, platelets of 99, Cr 1.44, lactate of 4.9 and troponin of 0.1 peak. EGD was completed revealing small EoV and gastric varices with stigmata of recent bleeding. BRTO was attempted, but there was thrombosis of splenic vein and he was transferred to [facility] for further management.             Past Medical History:   Reviewed and edited as appropriate  No past medical history on file.   Asthma  Hypertension  EtOH Cirrhosis, decompensated  Active EtOH use         Past Surgical History:   Reviewed and edited as appropriate   No past surgical history on file.    Nasal septum repair         Previous Endoscopy:   No results found for this or any previous visit.    EGD 4/2/19  Impression:       - Small (< 5 mm) esophageal varices.       - [place]-colored mucosa.       - Gastric varices, without bleeding. Likely source of recent bleeding        given stigmata.       - No gross lesions in the duodenal bulb, in the first portion of the        duodenum and in the second portion of the duodenum.       - No specimens collected.         Social History:   Reviewed and edited as  "appropriate  Social History     Socioeconomic History     Marital status: Not on file     Spouse name: Not on file     Number of children: Not on file     Years of education: Not on file     Highest education level: Not on file   Occupational History     Not on file   Social Needs     Financial resource strain: Not on file     Food insecurity:     Worry: Not on file     Inability: Not on file     Transportation needs:     Medical: Not on file     Non-medical: Not on file   Tobacco Use     Smoking status: Not on file   Substance and Sexual Activity     Alcohol use: Not on file     Drug use: Not on file     Sexual activity: Not on file   Lifestyle     Physical activity:     Days per week: Not on file     Minutes per session: Not on file     Stress: Not on file   Relationships     Social connections:     Talks on phone: Not on file     Gets together: Not on file     Attends Buddhist service: Not on file     Active member of club or organization: Not on file     Attends meetings of clubs or organizations: Not on file     Relationship status: Not on file     Intimate partner violence:     Fear of current or ex partner: Not on file     Emotionally abused: Not on file     Physically abused: Not on file     Forced sexual activity: Not on file   Other Topics Concern     Not on file   Social History Narrative     Not on file            Family History:   Reviewed and edited as appropriate  No family history on file.     Strong history of alcoholism in brother and father and many other relatives.          Allergies:   Reviewed and edited as appropriate   Allergies not on file     Pencillins         Medications:     No medications prior to admission.      Reviewed.          Review of Systems:   A complete review of systems was performed and is negative except as noted in the HPI           Physical Exam:   /85 (BP Location: Right arm)   Temp 98.2  F (36.8  C) (Oral)   Resp 16   Ht 1.778 m (5' 10\")   Wt 91.1 kg (200 lb " 12.8 oz)   SpO2 98%   BMI 28.81 kg/m    Wt:   Wt Readings from Last 2 Encounters:   04/05/19 91.1 kg (200 lb 12.8 oz)      Constitutional: cooperative, pleasant, not dyspneic/diaphoretic, no acute distress  Eyes: Sclera anicteric/injected  Ears/nose/mouth/throat: Normal oropharynx without ulcers or exudate, mucus membranes moist, hearing intact  Neck: supple, thyroid normal size  CV: +edema  Respiratory: Unlabored breathing  Lymph: No axillary, submandibular, supraclavicular lymphadenopathy  Abd: Mildly distended, +bs, no hepatosplenomegaly, nontender, no peritoneal signs  Skin: warm, perfused, no jaundice  Neuro: AAO x 3, No asterixis  Psych: Normal affect  MSK: Normal gait         Data:   Labs and imaging below were independently reviewed and interpreted    BMPNo lab results found in last 7 days.  CBCNo lab results found in last 7 days.  INRNo lab results found in last 7 days.  LFTsNo lab results found in last 7 days.   PANCNo lab results found in last 7 days.    Imaging:  U/S Abdomen 4/5/19  1. Patent hepatic vasculature. No evidence of portal vein thrombosis.  2. Cirrhosis.   3. Moderate amount of perihepatic ascites.  4. Cholelithiasis and biliary sludge within a mildly thickened  gallbladder, likely related to hypoalbuminemia.  ATTENDING NOTE, GASTROENTEROLOGY/HEPATOLOGY    I discussed this patient with the fellow and participated in the decision making. I agree with the fellow's note. Izabela Rueda MD

## 2019-04-06 NOTE — PROGRESS NOTES
Time of notification: 5:12 PM  Provider notified: Negrita rigoberto  Patient status: FYI hgb 7.8  Temp:  [97.6  F (36.4  C)-98.4  F (36.9  C)] 97.6  F (36.4  C)  Heart Rate:  [62-82] 62  Resp:  [16-18] 16  BP: (147-168)/() 166/78  SpO2:  [94 %-100 %] 97 %  Orders received: awaiting orders, will continue to monitor.

## 2019-04-07 VITALS
DIASTOLIC BLOOD PRESSURE: 82 MMHG | HEIGHT: 70 IN | TEMPERATURE: 98.4 F | SYSTOLIC BLOOD PRESSURE: 141 MMHG | WEIGHT: 200.9 LBS | RESPIRATION RATE: 20 BRPM | HEART RATE: 71 BPM | BODY MASS INDEX: 28.76 KG/M2 | OXYGEN SATURATION: 97 %

## 2019-04-07 LAB
ALBUMIN SERPL-MCNC: 2.8 G/DL (ref 3.4–5)
ALP SERPL-CCNC: 69 U/L (ref 40–150)
ALT SERPL W P-5'-P-CCNC: 29 U/L (ref 0–70)
ANION GAP SERPL CALCULATED.3IONS-SCNC: 8 MMOL/L (ref 3–14)
AST SERPL W P-5'-P-CCNC: 25 U/L (ref 0–45)
BILIRUB SERPL-MCNC: 1.2 MG/DL (ref 0.2–1.3)
BUN SERPL-MCNC: 11 MG/DL (ref 7–30)
CALCIUM SERPL-MCNC: 7.8 MG/DL (ref 8.5–10.1)
CHLORIDE SERPL-SCNC: 107 MMOL/L (ref 94–109)
CO2 SERPL-SCNC: 23 MMOL/L (ref 20–32)
CREAT SERPL-MCNC: 1.08 MG/DL (ref 0.66–1.25)
ERYTHROCYTE [DISTWIDTH] IN BLOOD BY AUTOMATED COUNT: 16.9 % (ref 10–15)
GFR SERPL CREATININE-BSD FRML MDRD: 70 ML/MIN/{1.73_M2}
GLUCOSE SERPL-MCNC: 114 MG/DL (ref 70–99)
HCT VFR BLD AUTO: 25.7 % (ref 40–53)
HGB BLD-MCNC: 7.7 G/DL (ref 13.3–17.7)
INR PPP: 1.48 (ref 0.86–1.14)
MCH RBC QN AUTO: 29.5 PG (ref 26.5–33)
MCHC RBC AUTO-ENTMCNC: 30 G/DL (ref 31.5–36.5)
MCV RBC AUTO: 99 FL (ref 78–100)
PLATELET # BLD AUTO: 44 10E9/L (ref 150–450)
POTASSIUM SERPL-SCNC: 3.8 MMOL/L (ref 3.4–5.3)
PROT SERPL-MCNC: 5.6 G/DL (ref 6.8–8.8)
RBC # BLD AUTO: 2.61 10E12/L (ref 4.4–5.9)
SODIUM SERPL-SCNC: 138 MMOL/L (ref 133–144)
WBC # BLD AUTO: 4 10E9/L (ref 4–11)

## 2019-04-07 PROCEDURE — 25000132 ZZH RX MED GY IP 250 OP 250 PS 637: Performed by: STUDENT IN AN ORGANIZED HEALTH CARE EDUCATION/TRAINING PROGRAM

## 2019-04-07 PROCEDURE — 25000128 H RX IP 250 OP 636: Performed by: STUDENT IN AN ORGANIZED HEALTH CARE EDUCATION/TRAINING PROGRAM

## 2019-04-07 PROCEDURE — 36415 COLL VENOUS BLD VENIPUNCTURE: CPT | Performed by: STUDENT IN AN ORGANIZED HEALTH CARE EDUCATION/TRAINING PROGRAM

## 2019-04-07 PROCEDURE — 99238 HOSP IP/OBS DSCHRG MGMT 30/<: CPT | Performed by: HOSPITALIST

## 2019-04-07 PROCEDURE — 85027 COMPLETE CBC AUTOMATED: CPT | Performed by: STUDENT IN AN ORGANIZED HEALTH CARE EDUCATION/TRAINING PROGRAM

## 2019-04-07 PROCEDURE — 25000132 ZZH RX MED GY IP 250 OP 250 PS 637

## 2019-04-07 PROCEDURE — 85610 PROTHROMBIN TIME: CPT | Performed by: STUDENT IN AN ORGANIZED HEALTH CARE EDUCATION/TRAINING PROGRAM

## 2019-04-07 PROCEDURE — 80053 COMPREHEN METABOLIC PANEL: CPT | Performed by: STUDENT IN AN ORGANIZED HEALTH CARE EDUCATION/TRAINING PROGRAM

## 2019-04-07 RX ORDER — CIPROFLOXACIN 500 MG/1
500 TABLET, FILM COATED ORAL 2 TIMES DAILY
Qty: 10 TABLET | Refills: 0 | Status: SHIPPED | OUTPATIENT
Start: 2019-04-07 | End: 2019-05-13

## 2019-04-07 RX ORDER — PANTOPRAZOLE SODIUM 40 MG/1
40 TABLET, DELAYED RELEASE ORAL
Qty: 60 TABLET | Refills: 0 | Status: SHIPPED | OUTPATIENT
Start: 2019-04-08 | End: 2019-05-13

## 2019-04-07 RX ADMIN — PROPRANOLOL HYDROCHLORIDE 10 MG: 10 TABLET ORAL at 08:06

## 2019-04-07 RX ADMIN — PANTOPRAZOLE SODIUM 40 MG: 40 TABLET, DELAYED RELEASE ORAL at 08:06

## 2019-04-07 RX ADMIN — CEFTRIAXONE 1 G: 1 INJECTION, POWDER, FOR SOLUTION INTRAMUSCULAR; INTRAVENOUS at 12:00

## 2019-04-07 ASSESSMENT — ACTIVITIES OF DAILY LIVING (ADL)
ADLS_ACUITY_SCORE: 10

## 2019-04-07 ASSESSMENT — MIFFLIN-ST. JEOR: SCORE: 1692.53

## 2019-04-07 NOTE — DISCHARGE INSTRUCTIONS
After discharge please reach out to [facility] [place] to [facility] you and evaluate you after your hospital discharge.  Your primary care doctor will be able to make a referral for  ongoing hepatology/liver disease care at the [facility].    Please also continue to take your usual medications at the doses and schedule as he was taking before he came to the hospital. No changes were made to your usual medications    We also anticipate that the interventional radiology team will be reaching out to you in the next few days to plan and prepare for the TIPS  procedure.    If you develop any bleeding or blood in your vomit or stool please let your doctor know and we advised that he go to hospital to be evaluated.

## 2019-04-07 NOTE — DISCHARGE SUMMARY
Medicine Discharge Summary  Varghese Whitfield MRN: [id number]  1950  Primary care provider: Tremayne Deleon  ___________________________________          Date of Admission:  4/5/2019  Date of Discharge:  4/7/2019   Admitting Physician:  Jemma Thomas DO  Discharge Physician:  Kavon Reyes  Discharging Service:  Internal Medicine, [place] [id number]     Primary Provider: Tremayne Deleon         Reason for Admission:   Upper GI bleeding           Discharge Diagnosis:   1. Decompensated EtOH Cirrhosis  2. UGIB 2/2 Gastric Variceal Hemorrhage; no bleeding since 4/1/19  3. Alcohol Abuse         Procedures & Significant Findings:   1. Abdominal Ultrasound scan: 4/6/2019: Fluid: Moderate amount of perihepatic ascites. Patent hepatic vasculature. No evidence of portal vein thrombosis. Cholelithiasis and biliary sludge within a mildly thickened gallbladder, likely related to hypoalbuminemia.         Consultations:   - Gastroenterology  - Interventional radiology         Hospital Course by Problem:    Varghese Whitfield is a 68 year old male admitted on 4/5/2019 as a transfer in from [facility]. He has a PMH Asthma, HTN, EtOH Cirrhosis w/ gastric varicies, barrents esophagus, hx GIB in 2013, ongoing EtOH use and is transferred here for advanced endoscopic intervention for gastric variceal bleed.       Upper GI Bleed secondary to Gastric and Esophogeal Varices:  At OSH was started on appropriate treatment. Had EGD with gastric and esophageal varices and stigmata of bleeding, but no active bleed.  Now transferred here for possible endoscopic intervention. Hgb prior to transfer 8.4. And no evidence of bleeding when arrived to our hospital. The patient remained hemodynamically stable with stable hemoglobins around 7.7.  Patient was evaluated by hepatology who recommended IR consultation for TIPS. Interventional Radiology evaluated patient, found patient to be a suitable outpatient candidate for TIPS procedure.  -Patient will  "follow up with IR team as an outpatient to plan on scheduled TIPS.  -Patient encouraged to establish hepatology care at [facility] [place], operated [facility].  -Patient encouraged to have absolute alcohol cessation, to seek [facility] the [facility] for [facility].  -Complete 7-day course of ciprofloxacin for GI bleed and cirrhotic  -Continue oral pantoprazole for next 8 weeks  -Patient encouraged to return to health facility immediately if recurrent bleeding.       Ongoing EtOH use:  Possible hx withdrawal seizure:  Last drink 4/1.  Drinks heavily daily.  He denies history of withdrawal but as it is noted in the chart will monitor closely, although he is nearing the end of the window during which I would be most concerned.  CIWA negative at OSH 4/2-5 and here.   - Advised on alcohol cessation. Patient reportedly has support from friends in AA, he intends to get back into AA.  - [facility] to explore support/resources for vets     Lower extremity edema: Suspect degree of volume overload with fluids at OSH and holding PTA diuretic.  Initially held PTA diuretic pending repeat labs and hemodynamic stability. Resumed at discharge     Paroxysmal Afib at OSH: [id number] 2 for age and HTN.  Returned to NSR prior to transfer.  And trop was trended to peak, felt to have been likely due to demand.  No chest pain or SOB currently.       SRIDHAR: Resolved.    Creatine 1.44 at admission on 4/2, has now normalized as of 4/5 am.  -Monitor     Lactate elevation: resolved     Chronic Issues:  HTN: Held hydrochlorothiazide/lisinopril in setting of recent SRIDHAR and GIB. Resumed at discharge.  Asthma: PTA PRN inhaler available  Peripheral Neuropathy: resumed gabapentin at discharge       Physical Exam on day of Discharge:  Blood pressure 141/82, pulse 71, temperature 98.4  F (36.9  C), temperature source Oral, resp. rate 20, height 1.778 m (5' 10\"), weight 91.1 kg (200 lb 14.4 oz), SpO2 97 %.  Constitutional: cooperative, pleasant, " not dyspneic/diaphoretic, no acute distress  Eyes: Sclera anicteric/injected  Ears/nose/mouth/throat: Normal oropharynx without ulcers or exudate, mucus membranes moist, hearing intact  Neck: supple, thyroid normal size  CV: +edema  Respiratory: Unlabored breathing  Lymph: No axillary, submandibular, supraclavicular lymphadenopathy  Abd: Mildly distended, +bs, no hepatosplenomegaly, nontender, no peritoneal signs  Skin: warm, perfused, no jaundice  Neuro: AAO x 3, No asterixis  Psych: Normal affect  MSK: Normal gait         Pending Results:   None         Discharge Medications:     Discharge Medication List as of 4/7/2019 10:55 AM      START taking these medications    Details   ciprofloxacin (CIPRO) 500 MG tablet Take 1 tablet (500 mg) by mouth 2 times daily for 5 days, Disp-10 tablet, R-0, E-Prescribe      pantoprazole (PROTONIX) 40 MG EC tablet Take 1 tablet (40 mg) by mouth every morning (before breakfast), Disp-60 tablet, R-0, E-Prescribe                  Discharge Instructions and Follow-Up:     Discharge Procedure Orders   Reason for your hospital stay   Order Comments: Acute upper GI bleeding from varices, this is a complication of alcoholic liver cirrhosis.     Adult [facility] Follow-up and recommended labs and tests   Order Comments: Follow up with Dr. Deleon , at [facility] , within 1-2 weeks for hospital follow- up. The following labs/tests are recommended: CBC, INR, CMP.     Activity   Order Comments: Your activity upon discharge: activity as tolerated and activity as tolerated and no driving for today     Order Specific Question Answer Comments   Is discharge order? Yes      When to contact your care team   Order Comments: Call your primary doctor if you have any of the following: bleeding from any site, bloody or back stools, or vomiting blood or feeling very weak/faint/lightheaded.     DNR/DNI     Order Specific Question Answer Comments   Code status determined by: Discussion with patient/legal  decision maker      Diet   Order Comments: Follow this diet upon discharge: Orders Placed This Encounter      Regular Diet Adult     Order Specific Question Answer Comments   Is discharge order? Yes              Discharge Disposition:   Home         Condition on Discharge:   Discharge condition: Stable   Code status on discharge: DNR/DNI        Dr. Kavon Reyes MD MPH  Internal Medicine and Pediatric Hospitalist  [phone]

## 2019-04-07 NOTE — PROGRESS NOTES
DISCHARGE                         No discharge date for patient encounter.  ----------------------------------------------------------------------------  Discharged to: Home  Via: private transportation  Accompanied by: Family  Discharge Instructions: Regular diet, Resume activity as tolerated.No drivin today, medications, follow up appointments, when to call the MD, aftercare instructions.  Prescriptions: To be filled by [facility] discharge pharmacy; medication list reviewed & sent with pt  Follow Up Appointments: arranged; information given  Belongings: All sent with pt  IV: d/c'd  Telemetry: d/c'd  Pt exhibits understanding of above discharge instructions; all questions answered.    Discharge Paperwork: Signed, copied, and sent home with patient.

## 2019-04-07 NOTE — PLAN OF CARE
Neuro: A&Ox4. CIWA q4h- scored 0.   Cardiac: SR. HR 60s. VSS. Afebrile   Respiratory: Sating >95% on RA.  GI/: Adequate urine output.   Diet/appetite: Tolerating regular diet  Activity:  Assist of standby, up to chair and in halls.  Pain: Patient denies pain  Skin: No new deficits noted.  LDA's: R PIV x2- SL

## 2019-04-08 ENCOUNTER — TELEPHONE (OUTPATIENT)
Dept: VASCULAR SURGERY | Facility: CLINIC | Age: 69
End: 2019-04-08

## 2019-04-08 DIAGNOSIS — K92.0 GASTROINTESTINAL HEMORRHAGE WITH HEMATEMESIS: Primary | ICD-10-CM

## 2019-04-08 NOTE — TELEPHONE ENCOUNTER
Called pt and inquired with him regarding new consult for TIPS with Dr. Gabe Wei.     Informed him that I understand he was seen over the weekend due to GI bleed.   I am calling to see if he would be able to come in today so that we can talk further in detail regarding TIPS stent placement.     He states that he can't come in today due to his wife works and he would like his wife present.     Will go ahead and schedule for next week with Dr Bernal on Mon 4/15 @ 940am.    Will have our schedulers send out map and appt reminder letter.    Informed him that I am also wondering if he'd like to see our [facility] or [facility]. He states that he's in the process of following up with [facility] Deleon. He would like to f/u with him first and seek care at the [facility]    I informed him that on his behalf, we do work closely with the [facility] Hep[facility] team in which I will go ahead and send a msg to them regarding his possible referral from his PCP.     He agrees to plan.     *Will also sent up a PAC appt for him on day of consult with Dr. Wei if possible.     Marlena Mullen, RN, BSN  Interventional Radiology Nurse Coordinator   Phone: [phone]

## 2019-04-09 ENCOUNTER — DOCUMENTATION ONLY (OUTPATIENT)
Dept: CARE COORDINATION | Facility: CLINIC | Age: 69
End: 2019-04-09

## 2019-04-10 NOTE — TELEPHONE ENCOUNTER
Medical Records Request For Appointment  URGENT!            To: [facility] From: Olimpia Acosta - Clinic Coordinator  [facility]   Fax: [phone] Fax: [phone]   Date: 4/10/2019   Phone: [phone]   Pages: 1 Mailing Address: [facility]  Attn: Olimpia Acosta, Clinic Coordinator   [address], [address]  [place] [address]     PATIENT: Varghese Whitfield  : 1950    Please push the following imagin/3/19 CT ; US x2      Please fax medical records to fax # [phone] for patient s upcoming appointment in the Vascular Clinic.     Patient is being seen for:    PLEASE SEND:  TIME FRAME NEEDED:      Referring MD notes, office visit notes with any other related providers  1 Year     Related Pathology Reports All Dates     ED/UC/Hospital discharge notes 1 Year     Medication List Current     Any Related Imaging (X-ray)       *Reports and images*   1 Year     Please push images to [employer] PACS or mail the imaging disc   URGENT [employer] account #[id number] to the address above.  NON URGENT mail to the above address via [employer]       ** If no records related to Vascular, please send most recent lab work and physical. **          Confidentiality Notice:  The document(s) accompanying this fax may contain protected health information under state and federal law and is legally privileged.  The information is only for the use of the intended recipient named above.  The recipient or person responsible for delivering this information is prohibited by law from disclosing this information without proper authorization to any other party, unless required to do so by law or regulation.  If you are not the intended recipient, you are hereby notified that any review, dissemination, distribution, or copying of this message is strictly prohibited.  If you have received this communication in error, please notify us immediately by telephone to arrange for the return or destruction of the faxed  documents.  Thank you.

## 2019-04-10 NOTE — TELEPHONE ENCOUNTER
FUTURE VISIT INFORMATION      FUTURE VISIT INFORMATION:    Date: 4/15    Time: 820    Location: Vascular  REFERRAL INFORMATION:    Referring provider:      Referring providers clinic:  [facility]    Reason for visit/diagnosis  TIPS consult    RECORDS REQUESTED FROM:       Clinic name Comments Records Status Imaging Status   [facility] 4/3/19 CT ; US x2 Care Everywhere Resolved

## 2019-04-12 ENCOUNTER — PRE VISIT (OUTPATIENT)
Dept: SURGERY | Facility: CLINIC | Age: 69
End: 2019-04-12

## 2019-04-12 ENCOUNTER — DOCUMENTATION ONLY (OUTPATIENT)
Dept: OTHER | Facility: CLINIC | Age: 69
End: 2019-04-12

## 2019-04-12 NOTE — TELEPHONE ENCOUNTER
FUTURE VISIT INFORMATION      SURGERY INFORMATION:  T.I[facility]P.S., Dr.Reza Wei, [facility], per Sean at Vascular    RECORDS REQUESTED FROM:       [facility] Provider: [facility]- [facility], testing faxed     Pertinent Medical History:    Most recent EKG+ Tracin19- [facility]    Most recent ECHO: 19- [facility]    Most recent Cardiac Stress Test:    Most recent Coronary Angiogram:    Most recent PFT's:

## 2019-04-13 ASSESSMENT — ENCOUNTER SYMPTOMS
DIARRHEA: 0
EXERCISE INTOLERANCE: 0
JAUNDICE: 0
ABDOMINAL PAIN: 0
SLEEP DISTURBANCES DUE TO BREATHING: 0
ORTHOPNEA: 0
LIGHT-HEADEDNESS: 0
HYPOTENSION: 0
BLOOD IN STOOL: 1
LEG PAIN: 0
BOWEL INCONTINENCE: 0
HYPERTENSION: 0
CONSTIPATION: 0
HEARTBURN: 0
RECTAL PAIN: 0
SYNCOPE: 0
BLOATING: 1
PALPITATIONS: 0
VOMITING: 0
NAUSEA: 0

## 2019-04-15 ENCOUNTER — OFFICE VISIT (OUTPATIENT)
Dept: VASCULAR SURGERY | Facility: CLINIC | Age: 69
End: 2019-04-15
Payer: COMMERCIAL

## 2019-04-15 ENCOUNTER — PRE VISIT (OUTPATIENT)
Dept: VASCULAR SURGERY | Facility: CLINIC | Age: 69
End: 2019-04-15

## 2019-04-15 VITALS — HEART RATE: 61 BPM | SYSTOLIC BLOOD PRESSURE: 155 MMHG | DIASTOLIC BLOOD PRESSURE: 78 MMHG

## 2019-04-15 DIAGNOSIS — Z87.19 HISTORY OF GI BLEED: ICD-10-CM

## 2019-04-15 DIAGNOSIS — I86.4 GASTRIC VARICES: Primary | ICD-10-CM

## 2019-04-15 RX ORDER — MULTIVITAMIN
1 TABLET ORAL 2 TIMES DAILY
COMMUNITY

## 2019-04-15 RX ORDER — LISINOPRIL/HYDROCHLOROTHIAZIDE 10-12.5 MG
1 TABLET ORAL EVERY MORNING
COMMUNITY

## 2019-04-15 RX ORDER — HYDROXYZINE PAMOATE 50 MG/1
50 CAPSULE ORAL 2 TIMES DAILY
COMMUNITY

## 2019-04-15 RX ORDER — CETIRIZINE HYDROCHLORIDE 10 MG/1
10 TABLET ORAL EVERY MORNING
COMMUNITY

## 2019-04-15 RX ORDER — PROPRANOLOL HYDROCHLORIDE 10 MG/1
10 TABLET ORAL 2 TIMES DAILY
COMMUNITY

## 2019-04-15 ASSESSMENT — PAIN SCALES - GENERAL: PAINLEVEL: NO PAIN (0)

## 2019-04-15 NOTE — NURSING NOTE
Vascular Rooming Note    Varghese Whitfield's goals for this visit include:   Chief Complaint   Patient presents with     Consult     Marbin is here today for a new consult for tips.      GENI William

## 2019-04-15 NOTE — PATIENT INSTRUCTIONS
Dear Marbin,     Please call [phone] to schedule your PAC's or pre anesthesia clinic appointment,  I am canceling the appt for this Thursday.    We will call you with the appointment if your TIPS PLACEMENT with Dr Wei.    On the day of the appointment, you will check  in 2 hours early to your scheduled procedure.     Please check into the 3rd floor, Pre-Op , located  at [facility], located at [address] [place] [address].     *please note that you will be given General Anesthesia sedation for this procedure.       Reminders:    -Nothing to eat or drink after midnight the night before.     -Please take your morning medications as indicated with enough water to swallow.     -Please also note that you will be going home, so therefore make sure that you have a .     *We ask that you continue to take your medications and attend Paracentesis appointments as scheduled. We do not want you to discontinue your medications, especially if it is related to your liver disease.     Once the TIPS is placed, it will take a few weeks for it to function properly, so therefore attending Paracentesis appointments is important as you will notice that over time there will be less fluid removed.     **Should you experience any of the symptoms below please let us know.     1. Confusion- Hepatic encephalopathy. This is a dangerous symptom that can happen after the TIPS procedure. Please go immediately to the Emergency room    2. Swelling of lower legs- please notify us as soon as possible. This can happen afterwards as well. Please make sure to connect with your Hepatologist/Liver doctor for any diuretics.     3. Fever and abd pain-please call me as this may indicate an infection or so.      We will call you 2-3 days after you go home.     Follow up: We will see you at 1, 3 ,6, 12 months after TIPS placement for proper follow up with an Ultrasound to evaluate if the TIPS is still patent.  Otherwise your Hepatologist will follow up with you.       Should you have any further questions, please call us anytime. It has been a pleasure to see you today.        Franchesca Velasco RN, BSN  Interventional Radiology Nurse Coordinator   Phone: [phone]

## 2019-04-15 NOTE — LETTER
4/15/2019       RE: Varghees Whitfield  [address]  [facility] [address]     Dear Colleague,    Thank you for referring your patient, Vraghese Whitfield, to the [facility] at [facility]. Please see a copy of my visit note below.        Interventional Radiology Clinic Visit    Date of this visit: 4/28/2019    Varghese Whitfield  is referred by  for treatment recommendations. The patient requires evaluation for diagnosis of recurrent upper GI bleeding.    Primary Physician: Tremayne Deleon     History Of Present Illness:    Varghese Whitfield is a 68 year old male who presents with diagnosis of hepatic cirrhosis, portal hypertension and variceal upper GI bleed on a background of EtOH cirrhosis.    I had the pleasure of meeting Mr. Whitfield during his hospitalization/transfer from an outside hospital for management of gastric variceal hemorrhage.  He is a 68-year-old gentleman with history of decompensated EtOH cirrhosis with concern for active alcohol consumption, hypertension, and asthma who underwent a work-up for upper GI bleeding, which was related to gastric variceal hemorrhage.  His last episode of GI bleeding was on 4/1/2019.  He has mild bursitis discovered on imaging, which remains asymptomatic.  He does not provide any history of hepatic encephalopathy or lower extremity edema.  He does have some tendency for easy bruising.    I was asked to place a TIPS during his hospitalization (he was discharged on 4/7/2019).  However given lack of continued hemorrhage and absence of an established outpatient hematology care and work-up decision was made to defer this procedure.  He is here today with his spouse and the procedure, its benefits, risks and alternatives were discussed in details with them.    His outside CT scan from 4/3/2019 and ultrasound from 4/5/2019 were reviewed.  His anatomy is somewhat challenging given small size of the liver, however both hepatic and  portal veins are patent.  There is suspicion of a hepatic mass, however I would allow further work-up to be performed after establishment of hepatology care.  He is receiving most of his care at [facility] and I will contact the early hepatology attending physicians in this regard.    He does not provide any history of coronary artery disease, heart failure, pulmonary hypertension or lower extremity swelling.    Review of Systems:    General: Negative for recent fever.  Skin: Negative for jaundice.  Eyes: Negative for jaundice.  Respiratory: Negative for shortness of breath.  Cardiovascular: Negative for chest pain.  Gastrointestinal: Negative for abdominal pain or swelling, nausea, vomiting, or diarrhea.  Musculoskeletal: Negative for ankle swelling.    Past Medical/Surgical History:    Past Medical History:   Diagnosis Date     Asthma      Cirrhosis with alcoholism (H)      HTN (hypertension)      Postherpetic neuralgia      Seizure (H)     Per outside records due to EtOH withdrawal, patient does not remember this      Thrombocytopenia (H)      Past Surgical History:   Procedure Laterality Date     AS LAP,INGUINAL HERNIA REPR,INITIAL       HEMORRHOIDECTOMY       SEPTOPLASTY      repaired for deviation     VASECTOMY         Current Medications:    Current Outpatient Medications   Medication Sig Dispense Refill     cetirizine (ZYRTEC) 10 MG tablet Take 10 mg by mouth       hydrOXYzine (VISTARIL) 50 MG capsule Take 50 mg by mouth       lisinopril-hydrochlorothiazide (PRINZIDE/ZESTORETIC) 10-12.5 MG tablet Take 1 tablet by mouth       Multiple Vitamin (MULTI VITAMIN W/D-3) TABS Take 1 tablet by mouth       omeprazole (PRILOSEC) 20 MG DR capsule Take 20 mg by mouth       pantoprazole (PROTONIX) 40 MG EC tablet Take 1 tablet (40 mg) by mouth every morning (before breakfast) 60 tablet 0     propranolol (INDERAL) 10 MG tablet Take 10 mg by mouth         Allergies:    Penicillins    Family History:    Family History    Problem Relation Age of Onset     Myocardial Infarction Mother      Myocardial Infarction Father      Alcoholism Father      No Known Problems Sister         History of hemochomatosis, a1at def, or other liver disease       Social History:    Patient has remained abstinent since his hospital discharge.    Social History     Socioeconomic History     Marital status:      Spouse name: Not on file     Number of children: Not on file     Years of education: Not on file     Highest education level: Not on file   Occupational History     Not on file   Social Needs     Financial resource strain: Not on file     Food insecurity:     Worry: Not on file     Inability: Not on file     Transportation needs:     Medical: Not on file     Non-medical: Not on file   Tobacco Use     Smoking status: Never Smoker     Smokeless tobacco: Never Used   Substance and Sexual Activity     Alcohol use: Yes     Frequency: 4 or more times a week     Drinks per session: 3 or 4     Comment: Up to 1-2 boxes of wine per day     Drug use: No     Sexual activity: Not on file   Lifestyle     Physical activity:     Days per week: Not on file     Minutes per session: Not on file     Stress: Not on file   Relationships     Social connections:     Talks on phone: Not on file     Gets together: Not on file     Attends Caodaism service: Not on file     Active member of club or organization: Not on file     Attends meetings of clubs or organizations: Not on file     Relationship status: Not on file     Intimate partner violence:     Fear of current or ex partner: Not on file     Emotionally abused: Not on file     Physically abused: Not on file     Forced sexual activity: Not on file   Other Topics Concern     Not on file   Social History Narrative     Not on file       Physical Exam:    /78   Pulse 61      GENERAL APPEARANCE: healthy, alert and no distress  PSYCHIATRIC: mentation appears normal and affect normal.  EYES: No jaundice.  SKIN:  No jaundice.   RESP: lungs clear to auscultation - no rales, rhonchi or wheezes  CARDIOVASCULAR: regular rates and rhythm, normal S1 S2, no S3 or S4 and no murmur  ABDOMEN:  soft, nontender, no masses and bowel sounds normal. MUSCULOSKELETAL: No edema in the lower extremities.    Laboratory Studies:    Lab Results   Component Value Date    HGB 7.7 04/07/2019     Lab Results   Component Value Date    PLT 44 04/07/2019     Lab Results   Component Value Date    WBC 4.0 04/07/2019       Lab Results   Component Value Date    INR 1.48 04/07/2019       Lab Results   Component Value Date    PROTTOTAL 5.6 04/07/2019      Lab Results   Component Value Date    ALBUMIN 2.8 04/07/2019     Lab Results   Component Value Date    BILITOTAL 1.2 04/07/2019     No results found for: BILICONJ   Lab Results   Component Value Date    ALKPHOS 69 04/07/2019     Lab Results   Component Value Date    AST 25 04/07/2019     Lab Results   Component Value Date    ALT 29 04/07/2019       Lab Results   Component Value Date    CR 1.08 04/07/2019     Lab Results   Component Value Date    BUN 11 04/07/2019       No results found for: FETO    Imaging:     CT dated 4/3/2019 from an outside hospital reveals a cirrhotic liver and patent port and hepatic veins.  The anatomy is somewhat challenging given small size of the liver.    ASSESSMENT:      Varghese Whitfield is a 68 year old male with decompensated EtOH cirrhosis.  Marbin's portal and hepatic veins are patent and there is no liver mass. The patient's MELD score is 12.  Mr. Whitfield is therefore a suitable candidate for a TIPS procedure.   I discussed with the patient what a TIPS procedure is and showed them pictures of the stent and the way we join a hepatic and portal vein. I explained why the procedure is performed and how it decompresses the portal venous system. We discussed the risks of the procedure, which include but are not limited to infection such as liver abscess, biloma formation, damage to nearby  vessels or bile ducts, bleeding, failure of the procedure, stent maldeployment, liver failure, right heart failure, and encephalopathy. We discussed the alternative of not doing the procedure, which would be endoscopy and medical management.   I discussed that the procedure will be performed under general anaesthesia and that Marbin can possibly be admitted overnight.  Some patients go home the same day and some patients go home the next day, but occasionally circumstances are such that a longer admission may be appropriate. I explained she will be followed with regular TIPS ultrasounds due to the risk of narrowing or thrombosis within the stent over time. We discussed that while some patients do not respond to the procedure, most patients experience relief or decrease in severity and frequency of their GIB after the procedure.  I informed the patient that I will be assisted during the procedure and that this likely will include a resident and/or fellow. All of the patient's questions were answered and s/he agreed to proceed.     PLAN:    1.  Establish hepatology care at [facility].  2.  Once the patient has established outpatient hepatology care and no barriers  Are found to perform the TIPS, we will schedule the patient for the TIPS procedure.  3.  Given patient's age and lack of long-term care at our institution I would like for him to visit PACS clinic, prior to undergoing general anesthesia for risk stratification.    A total of 45 minutes was spent in care for the patient, of which >50% was spent in counseling.     It was a pleasure seeing the patient.     SignedGabe M.D.  Department of Interventional Radiology  [facility]    CC  Patient Care Team:  Kristy Morley MD as PCP - General  KRISTY MORLEY

## 2019-04-16 ENCOUNTER — PRE VISIT (OUTPATIENT)
Dept: SURGERY | Facility: CLINIC | Age: 69
End: 2019-04-16

## 2019-04-16 NOTE — TELEPHONE ENCOUNTER
FUTURE VISIT INFORMATION      SURGERY INFORMATION:    Date: 5/3/19    Location:  [facility] OR    Surgeon:  Gabe Wei    Anesthesia Type:  General    RECORDS REQUESTED FROM:       [facility]- [facility] testing- records received and sent to scanning 4/23/19

## 2019-04-19 ENCOUNTER — TRANSFERRED RECORDS (OUTPATIENT)
Dept: HEALTH INFORMATION MANAGEMENT | Facility: CLINIC | Age: 69
End: 2019-04-19

## 2019-04-25 ENCOUNTER — TRANSFERRED RECORDS (OUTPATIENT)
Dept: HEALTH INFORMATION MANAGEMENT | Facility: CLINIC | Age: 69
End: 2019-04-25

## 2019-04-29 NOTE — PROGRESS NOTES
Interventional Radiology Clinic Visit    Date of this visit: 4/28/2019    Varghese Whitfield  is referred by  for treatment recommendations. The patient requires evaluation for diagnosis of recurrent upper GI bleeding.    Primary Physician: Tremayne Deleon        History Of Present Illness:    Varghese Whitfield is a 68 year old male who presents with diagnosis of hepatic cirrhosis, portal hypertension and variceal upper GI bleed on a background of EtOH cirrhosis.    I had the pleasure of meeting Mr. Whitfield during his hospitalization/transfer from an outside hospital for management of gastric variceal hemorrhage.  He is a 68-year-old gentleman with history of decompensated EtOH cirrhosis with concern for active alcohol consumption, hypertension, and asthma who underwent a work-up for upper GI bleeding, which was related to gastric variceal hemorrhage.  His last episode of GI bleeding was on 4/1/2019.  He has mild bursitis discovered on imaging, which remains asymptomatic.  He does not provide any history of hepatic encephalopathy or lower extremity edema.  He does have some tendency for easy bruising.    I was asked to place a TIPS during his hospitalization (he was discharged on 4/7/2019).  However given lack of continued hemorrhage and absence of an established outpatient hematology care and work-up decision was made to defer this procedure.  He is here today with his spouse and the procedure, its benefits, risks and alternatives were discussed in details with them.    His outside CT scan from 4/3/2019 and ultrasound from 4/5/2019 were reviewed.  His anatomy is somewhat challenging given small size of the liver, however both hepatic and portal veins are patent.  There is suspicion of a hepatic mass, however I would allow further work-up to be performed after establishment of hepatology care.  He is receiving most of his care at [facility] and I will contact the early hepatology attending physicians in  this regard.    He does not provide any history of coronary artery disease, heart failure, pulmonary hypertension or lower extremity swelling.    Review of Systems:    General: Negative for recent fever.  Skin: Negative for jaundice.  Eyes: Negative for jaundice.  Respiratory: Negative for shortness of breath.  Cardiovascular: Negative for chest pain.  Gastrointestinal: Negative for abdominal pain or swelling, nausea, vomiting, or diarrhea.  Musculoskeletal: Negative for ankle swelling.    Past Medical/Surgical History:    Past Medical History:   Diagnosis Date     Asthma      Cirrhosis with alcoholism (H)      HTN (hypertension)      Postherpetic neuralgia      Seizure (H)     Per outside records due to EtOH withdrawal, patient does not remember this      Thrombocytopenia (H)      Past Surgical History:   Procedure Laterality Date     AS LAP,INGUINAL HERNIA REPR,INITIAL       HEMORRHOIDECTOMY       SEPTOPLASTY      repaired for deviation     VASECTOMY         Current Medications:    Current Outpatient Medications   Medication Sig Dispense Refill     cetirizine (ZYRTEC) 10 MG tablet Take 10 mg by mouth       hydrOXYzine (VISTARIL) 50 MG capsule Take 50 mg by mouth       lisinopril-hydrochlorothiazide (PRINZIDE/ZESTORETIC) 10-12.5 MG tablet Take 1 tablet by mouth       Multiple Vitamin (MULTI VITAMIN W/D-3) TABS Take 1 tablet by mouth       omeprazole (PRILOSEC) 20 MG DR capsule Take 20 mg by mouth       pantoprazole (PROTONIX) 40 MG EC tablet Take 1 tablet (40 mg) by mouth every morning (before breakfast) 60 tablet 0     propranolol (INDERAL) 10 MG tablet Take 10 mg by mouth         Allergies:    Penicillins    Family History:    Family History   Problem Relation Age of Onset     Myocardial Infarction Mother      Myocardial Infarction Father      Alcoholism Father      No Known Problems Sister         History of hemochomatosis, a1at def, or other liver disease       Social History:    Patient has remained abstinent  since his hospital discharge.    Social History     Socioeconomic History     Marital status:      Spouse name: Not on file     Number of children: Not on file     Years of education: Not on file     Highest education level: Not on file   Occupational History     Not on file   Social Needs     Financial resource strain: Not on file     Food insecurity:     Worry: Not on file     Inability: Not on file     Transportation needs:     Medical: Not on file     Non-medical: Not on file   Tobacco Use     Smoking status: Never Smoker     Smokeless tobacco: Never Used   Substance and Sexual Activity     Alcohol use: Yes     Frequency: 4 or more times a week     Drinks per session: 3 or 4     Comment: Up to 1-2 boxes of wine per day     Drug use: No     Sexual activity: Not on file   Lifestyle     Physical activity:     Days per week: Not on file     Minutes per session: Not on file     Stress: Not on file   Relationships     Social connections:     Talks on phone: Not on file     Gets together: Not on file     Attends Restorationist service: Not on file     Active member of club or organization: Not on file     Attends meetings of clubs or organizations: Not on file     Relationship status: Not on file     Intimate partner violence:     Fear of current or ex partner: Not on file     Emotionally abused: Not on file     Physically abused: Not on file     Forced sexual activity: Not on file   Other Topics Concern     Not on file   Social History Narrative     Not on file       Physical Exam:    /78   Pulse 61      GENERAL APPEARANCE: healthy, alert and no distress  PSYCHIATRIC: mentation appears normal and affect normal.  EYES: No jaundice.  SKIN: No jaundice.   RESP: lungs clear to auscultation - no rales, rhonchi or wheezes  CARDIOVASCULAR: regular rates and rhythm, normal S1 S2, no S3 or S4 and no murmur  ABDOMEN:  soft, nontender, no masses and bowel sounds normal. MUSCULOSKELETAL: No edema in the lower  extremities.    Laboratory Studies:    Lab Results   Component Value Date    HGB 7.7 04/07/2019     Lab Results   Component Value Date    PLT 44 04/07/2019     Lab Results   Component Value Date    WBC 4.0 04/07/2019       Lab Results   Component Value Date    INR 1.48 04/07/2019       Lab Results   Component Value Date    PROTTOTAL 5.6 04/07/2019      Lab Results   Component Value Date    ALBUMIN 2.8 04/07/2019     Lab Results   Component Value Date    BILITOTAL 1.2 04/07/2019     No results found for: BILICONJ   Lab Results   Component Value Date    ALKPHOS 69 04/07/2019     Lab Results   Component Value Date    AST 25 04/07/2019     Lab Results   Component Value Date    ALT 29 04/07/2019       Lab Results   Component Value Date    CR 1.08 04/07/2019     Lab Results   Component Value Date    BUN 11 04/07/2019       No results found for: FETO    Imaging:     CT dated 4/3/2019 from an outside hospital reveals a cirrhotic liver and patent port and hepatic veins.  The anatomy is somewhat challenging given small size of the liver.    ASSESSMENT:      Varghese Whitfield is a 68 year old male with decompensated EtOH cirrhosis.  Marbin's portal and hepatic veins are patent and there is no liver mass. The patient's MELD score is 12.  Mr. Whitfield is therefore a suitable candidate for a TIPS procedure.   I discussed with the patient what a TIPS procedure is and showed them pictures of the stent and the way we join a hepatic and portal vein. I explained why the procedure is performed and how it decompresses the portal venous system. We discussed the risks of the procedure, which include but are not limited to infection such as liver abscess, biloma formation, damage to nearby vessels or bile ducts, bleeding, failure of the procedure, stent maldeployment, liver failure, right heart failure, and encephalopathy. We discussed the alternative of not doing the procedure, which would be endoscopy and medical management.   I discussed that the  procedure will be performed under general anaesthesia and that Marbin can possibly be admitted overnight.  Some patients go home the same day and some patients go home the next day, but occasionally circumstances are such that a longer admission may be appropriate. I explained she will be followed with regular TIPS ultrasounds due to the risk of narrowing or thrombosis within the stent over time. We discussed that while some patients do not respond to the procedure, most patients experience relief or decrease in severity and frequency of their GIB after the procedure.  I informed the patient that I will be assisted during the procedure and that this likely will include a resident and/or fellow. All of the patient's questions were answered and s/he agreed to proceed.     PLAN:    1.  Establish hepatology care at [facility].  2.  Once the patient has established outpatient hepatology care and no barriers  Are found to perform the TIPS, we will schedule the patient for the TIPS procedure.  3.  Given patient's age and lack of long-term care at our institution I would like for him to visit PACS clinic, prior to undergoing general anesthesia for risk stratification.      A total of 45 minutes was spent in care for the patient, of which >50% was spent in counseling.     It was a pleasure seeing the patient.     SignedGabe M.D.  Department of Interventional Radiology  [facility]      CC  Patient Care Team:  Kristy Morley MD as PCP - General  KRISTY MORLEY

## 2019-05-01 ENCOUNTER — TRANSFERRED RECORDS (OUTPATIENT)
Dept: HEALTH INFORMATION MANAGEMENT | Facility: CLINIC | Age: 69
End: 2019-05-01

## 2019-05-06 ENCOUNTER — PRE VISIT (OUTPATIENT)
Dept: SURGERY | Facility: CLINIC | Age: 69
End: 2019-05-06

## 2019-05-06 NOTE — TELEPHONE ENCOUNTER
FUTURE VISIT INFORMATION      SURGERY INFORMATION:    Date: 19    Location: [facility] OR    Surgeon:  Gabe Wei    Anesthesia Type:  General    RECORDS REQUESTED FROM:       Primary Care Provider: Tremayne Deleon [facility]- [facility]    Pertinent Medical History: Hypertension    Most recent EKG+ Tracin19- [facility]- requested tracing - received and sent to scanning     Most recent ECHO: 19- [facility]

## 2019-05-10 RX ORDER — HEPARIN SOD,PORCINE/0.9 % NACL 5K/1000 ML
1000-10000 INTRAVENOUS SOLUTION INTRAVENOUS
Status: CANCELLED | OUTPATIENT
Start: 2019-05-10

## 2019-05-13 ENCOUNTER — OFFICE VISIT (OUTPATIENT)
Dept: SURGERY | Facility: CLINIC | Age: 69
End: 2019-05-13
Payer: COMMERCIAL

## 2019-05-13 ENCOUNTER — ANESTHESIA EVENT (OUTPATIENT)
Dept: SURGERY | Facility: CLINIC | Age: 69
DRG: 406 | End: 2019-05-13
Payer: COMMERCIAL

## 2019-05-13 VITALS
BODY MASS INDEX: 26.56 KG/M2 | HEIGHT: 70 IN | DIASTOLIC BLOOD PRESSURE: 83 MMHG | TEMPERATURE: 97.6 F | RESPIRATION RATE: 18 BRPM | HEART RATE: 62 BPM | WEIGHT: 185.5 LBS | SYSTOLIC BLOOD PRESSURE: 135 MMHG | OXYGEN SATURATION: 99 %

## 2019-05-13 DIAGNOSIS — K70.30 ALCOHOLIC CIRRHOSIS, UNSPECIFIED WHETHER ASCITES PRESENT (H): ICD-10-CM

## 2019-05-13 DIAGNOSIS — Z01.818 PRE-OP EXAMINATION: Primary | ICD-10-CM

## 2019-05-13 LAB
APTT PPP: 31 SEC (ref 22–37)
ERYTHROCYTE [DISTWIDTH] IN BLOOD BY AUTOMATED COUNT: 14.7 % (ref 10–15)
HCT VFR BLD AUTO: 35.1 % (ref 40–53)
HGB BLD-MCNC: 11 G/DL (ref 13.3–17.7)
INR PPP: 1.22 (ref 0.86–1.14)
MCH RBC QN AUTO: 28.4 PG (ref 26.5–33)
MCHC RBC AUTO-ENTMCNC: 31.3 G/DL (ref 31.5–36.5)
MCV RBC AUTO: 91 FL (ref 78–100)
PLATELET # BLD AUTO: 68 10E9/L (ref 150–450)
RBC # BLD AUTO: 3.88 10E12/L (ref 4.4–5.9)
WBC # BLD AUTO: 3.8 10E9/L (ref 4–11)

## 2019-05-13 RX ORDER — ACETAMINOPHEN 500 MG
1000 TABLET ORAL PRN
COMMUNITY

## 2019-05-13 RX ORDER — ALBUTEROL SULFATE 90 UG/1
2 AEROSOL, METERED RESPIRATORY (INHALATION) PRN
COMMUNITY

## 2019-05-13 RX ORDER — MULTIVIT-MIN/IRON/FOLIC ACID/K 18-600-40
500 CAPSULE ORAL EVERY EVENING
COMMUNITY

## 2019-05-13 ASSESSMENT — ENCOUNTER SYMPTOMS: DYSRHYTHMIAS: 1

## 2019-05-13 ASSESSMENT — MIFFLIN-ST. JEOR: SCORE: 1622.67

## 2019-05-13 NOTE — PATIENT INSTRUCTIONS
Preparing for Your Surgery      Name:  Varghese Whitfield   MRN:  [id number]   :  1950   Today's Date:  2019     Arriving for surgery:  Surgery date:  19  Arrival time:  8AM  Please come to:       [facility] [address]  [address] [place]  [address]    - ? parking is available in front of the hospital      -    Please proceed to [address] on the 3rd floor. [phone]?     - ?If you are in need of directions, wheelchair or escort please stop at the Information Desk in the lobby.  Inform the information person that you are here for surgery; a wheelchair and escort to [address] will be provided.?     What can I eat or drink?  -  You may have solid food or milk products until 8 hours prior to your surgery. 19, 2AM  -  You may have water, apple juice or 7up/Sprite until 2 hours prior to your surgery. 19, 8AM    Which medicines can I take?  Stop Aspirin, Multivitamins and supplements one week prior to surgery.  Hold Ibuprofen for 24 hours and/or Naproxen for 48 hours prior to surgery.   -  Do NOT take these medications in the morning, the day of surgery:    Lisinopril    -  Please take these medications the day of surgery:    Cetirizine(Zyrtec)  Hydroxyzine(Vistaril)   Omeprazole(Prilosec) Propranolol(Inderal)    -As Needed:   Acetaminophen  Albuterol Inhaler-use as needed and bring the day of surgery    How do I prepare myself?  -  Take two showers: one the night before surgery; and one the morning of surgery.         Use Scrubcare or Hibiclens to wash from neck down.  You may use your own shampoo and conditioner. No other hair products.   -  Do NOT use lotion, powder, deodorant, or antiperspirant the day of your surgery.  -  Do NOT wear jewelry.  - Do not bring your own medications to the hospital, except for inhalers and eye drops.  -  Bring your ID and insurance card.    Questions or Concerns:  -If you have questions or concerns regarding the day of  surgery, please call [phone].     -For questions after surgery please call your surgeons office.           AFTER YOUR SURGERY  Breathing exercises   Breathing exercises help you recover faster. Take deep breaths and let the air out slowly. This will:     Help you wake up after surgery.    Help prevent complications like pneumonia.  Preventing complications will help you go home sooner.   We may give you a breathing device (incentive spirometer) to encourage you to breathe deeply.   Nausea and vomiting   You may feel sick to your stomach after surgery; if so, let your nurse know.    Pain control:  After surgery, you may have pain. Our goal is to help you manage your pain. Pain medicine will help you feel comfortable enough to do activities that will help you heal.  These activities may include breathing exercises, walking and physical therapy.   To help your health care team treat your pain we will ask: 1) If you have pain  2) where it is located 3) describe your pain in your words  Methods of pain control include medications given by mouth, vein or by nerve block for some surgeries.  Sequential Compression Device (SCD) or Pneumo Boots:  You may need to wear SCD S on your legs or feet. These are wraps connected to a machine that pumps in air and releases it. The repeated pumping helps prevent blood clots from forming.

## 2019-05-13 NOTE — H&P
Pre-Operative H & P     CC:  Preoperative exam to assess for increased cardiopulmonary risk while undergoing surgery and anesthesia.    Date of Encounter: 5/13/2019  Primary Care Physician:  Tremayne Deleon     Reason for visit: pre operative examination, gastric varices    HPI  Varghese Whitfield is a 68 year old male who presents for pre-operative H & P in preparation for possible paracentesis, transjugular intrahepatic portosystemic shunt with Dr. Wei on 5/16/19 at [facility].     The patient is a 68 year old man who has a history of alcoholic cirrhosis who was hospitalized at [facility] from 4/2-4/5 for upper GI bleeding. He was then transferred to [facility] from 4/5-4/7 for consideration for TIPS but given his lack of continued bleeding and complete work up this was not done. He met with Dr. Wei on 4/15/19 for follow up after his hospitalization. They discussed establishing hepatology care at the [facility] and given that his MELD is 12 that he was a TIPS candidate. [facility] and [facility] on 5/1/19. He current denies any recurrence of hematemesis or blood in his bowel movements. He is eating healthier and has not had any more alcohol. The patient had discussed the risks and benefits with Dr. Wei and the patient is now scheduled for the procedure as above.     History is obtained from the patient and chart review    Past Medical History  Past Medical History:   Diagnosis Date     Anemia      Anxiety      Asthma     allergy related     Madden's esophagus      Cirrhosis with alcoholism (H)      HTN (hypertension)      Postherpetic neuralgia      Seizure (H)     Per outside records due to EtOH withdrawal, patient does not remember this      Thrombocytopenia (H)      UGIB (upper gastrointestinal bleed)        Past Surgical History  Past Surgical History:   Procedure Laterality Date     AS ESOPHAGOSCOPY, DIAGNOSTIC        HEMORRHOIDECTOMY       SEPTOPLASTY      repaired for deviation     VASECTOMY         Hx of Blood transfusions/reactions: recently transfused on 4/3/19. No reactions     Hx of abnormal bleeding or anti-platelet use: denies    Menstrual history: No LMP for male patient.:     Steroid use in the last year: denies    Personal or FH with difficulty with Anesthesia:  denies    Prior to Admission Medications  Current Outpatient Medications   Medication Sig Dispense Refill     acetaminophen (TYLENOL) 500 MG tablet Take 1,000 mg by mouth as needed for mild pain       Ascorbic Acid (VITAMIN C) 500 MG CAPS Take 500 mg by mouth every evening       cetirizine (ZYRTEC) 10 MG tablet Take 10 mg by mouth every morning        Glucosamine HCl-MSM (GLUCOSAMINE-MSM PO) Take 1,500 mg by mouth every evening       hydrOXYzine (VISTARIL) 50 MG capsule Take 50 mg by mouth 2 times daily        lisinopril-hydrochlorothiazide (PRINZIDE/ZESTORETIC) 10-12.5 MG tablet Take 1 tablet by mouth every morning        Multiple Vitamin (MULTI VITAMIN W/D-3) TABS Take 1 tablet by mouth 2 times daily        omeprazole (PRILOSEC) 20 MG DR capsule Take 20 mg by mouth 2 times daily        propranolol (INDERAL) 10 MG tablet Take 10 mg by mouth 2 times daily        albuterol (PROVENTIL HFA) 108 (90 Base) MCG/ACT inhaler Inhale 2 puffs into the lungs as needed         Allergies  Allergies   Allergen Reactions     Penicillins Hives and Itching     Hives, itching about 17 years ago.        Social History  Social History     Socioeconomic History     Marital status:      Spouse name: Not on file     Number of children: Not on file     Years of education: Not on file     Highest education level: Not on file   Occupational History     Not on file   Social Needs     Financial resource strain: Not on file     Food insecurity:     Worry: Not on file     Inability: Not on file     Transportation needs:     Medical: Not on file     Non-medical: Not on file    Tobacco Use     Smoking status: Never Smoker     Smokeless tobacco: Never Used   Substance and Sexual Activity     Alcohol use: Not Currently     Frequency: 4 or more times a week     Drinks per session: 3 or 4     Drug use: No     Sexual activity: Not on file   Lifestyle     Physical activity:     Days per week: Not on file     Minutes per session: Not on file     Stress: Not on file   Relationships     Social connections:     Talks on phone: Not on file     Gets together: Not on file     Attends Jewish service: Not on file     Active member of club or organization: Not on file     Attends meetings of clubs or organizations: Not on file     Relationship status: Not on file     Intimate partner violence:     Fear of current or ex partner: Not on file     Emotionally abused: Not on file     Physically abused: Not on file     Forced sexual activity: Not on file   Other Topics Concern     Not on file   Social History Narrative     Not on file       Family History  Family History   Problem Relation Age of Onset     Other - See Comments Mother         passed away in her sleep     Myocardial Infarction Father      Alcoholism Father      No Known Problems Sister        Review of Systems    ROS/MED HX    ENT/Pulmonary:     (+)Intermittent asthma Treatment: Inhaler prn,  , . .    Neurologic: Comment: Withdrawal seizure x1 - neg neurologic ROS     Cardiovascular: Comment: A. Fib x1 on 4/2/19.     (+) hypertension----. : . . . :. dysrhythmias a-fib, . Previous cardiac testing Echodate:4/2/19results:date: results:ECG reviewed date:3/14/13 results:SR date: results:          METS/Exercise Tolerance:  >4 METS   Hematologic:     (+) Anemia, History of Transfusion no previous transfusion reaction Other Hematologic Disorder-thrombocytopenia      (-) history of blood clots   Musculoskeletal:  - neg musculoskeletal ROS       GI/Hepatic:     (+) liver disease, Other GI/Hepatic recurrent UGIB, alcoholic cirrhosis, portal HTN  "gastropathy, hoff's esophagus      Renal/Genitourinary:  - ROS Renal section negative       Endo:  - neg endo ROS       Psychiatric:     (+) psychiatric history anxiety and other (comment) (History of alcoholism)      Infectious Disease:  - neg infectious disease ROS       Malignancy:      - no malignancy   Other:    (+) no H/O Chronic Pain,no other significant disability          The complete review of systems is negative other than noted in the HPI or here.   Temp: 97.6  F (36.4  C) Temp src: Oral BP: 135/83 Pulse: 62   Resp: 18 SpO2: 99 %         185 lbs 8 oz  5' 10\"   Body mass index is 26.62 kg/m .       Physical Exam  Constitutional: Awake, alert, cooperative, no apparent distress, and appears stated age.  Eyes: Pupils equal, round and reactive to light, extra ocular muscles intact, sclera clear, conjunctiva normal.  HENT: Normocephalic, oral pharynx with moist mucus membranes, good dentition. No goiter appreciated.   Respiratory: Clear to auscultation bilaterally, no crackles or wheezing.  Cardiovascular: Regular rate and rhythm, normal S1 and S2, and no murmur noted.  Carotids +2, no bruits. No edema. Palpable pulses to radial  DP and PT arteries.   GI: Normal bowel sounds, soft, slightly-distended, non-tender, no overt ascites.   Lymph/Hematologic: No cervical lymphadenopathy and no supraclavicular lymphadenopathy.  Genitourinary:  defer  Skin: Warm and dry.  No rashes at anticipated surgical site.   Musculoskeletal: Limited ROM of neck. There is no redness, warmth, or swelling of the joints. Gross motor strength is normal.    Neurologic: Awake, alert, oriented to name, place and time. Cranial nerves II-XII are grossly intact. Gait is normal.   Neuropsychiatric: Calm, cooperative. Normal affect.     Labs: (personally reviewed)  Results for PJ JOSEPH (MRN [id number]) as of 5/13/2019 10:12   Ref. Range 5/13/2019 09:40   WBC Latest Ref Range: 4.0 - 11.0 10e9/L 3.8 (L)   Hemoglobin Latest Ref Range: 13.3 - " 17.7 g/dL 11.0 (L)   Hematocrit Latest Ref Range: 40.0 - 53.0 % 35.1 (L)   Platelet Count Latest Ref Range: 150 - 450 10e9/L 68 (L)   RBC Count Latest Ref Range: 4.4 - 5.9 10e12/L 3.88 (L)   MCV Latest Ref Range: 78 - 100 fl 91   MCH Latest Ref Range: 26.5 - 33.0 pg 28.4   MCHC Latest Ref Range: 31.5 - 36.5 g/dL 31.3 (L)   RDW Latest Ref Range: 10.0 - 15.0 % 14.7   INR Latest Ref Range: 0.86 - 1.14  1.22 (H)   PTT Latest Ref Range: 22 - 37 sec 31     The patient's labs came back and while hgb was improved platelets were at 68. This was discussed with Dr. Galindo and Dr. Wei was notified of the results. The patient did get a type and screen today so platelets would be available if needed. A recheck platelets was placed for the morning of his surgery.     EKG: 3/14/13  Normal sinus rhythm    Echo 4/2/19  Interpretation Summary    * The left ventricular systolic function is hyperdynamic, estimated LVEF  >70%.    * The hyperdynamic LV with small cavity size is suggestive of underfilling  of the LV.    * The proximal ascending aorta is normal in size measuring 4.2 cm.    * There is mild aortic regurgitation.    * No prior study.*    MRI abdomen with and without contrast 4/25/2019  Impression:  1.  Cirrhotic liver, evidence of portal hypertension with splenomegaly, portosystemic collaterals and small amount arterial enhancing lesion is limited due to early timing.  The hypercaloric lesion in the left hepatic lobe reported on ultrasound 11/8/2018 is not identified.  No suspicious liver lesion is seen.  Follow-up/screening with ultrasound in 6 months is recommended.  3.  Hypertrophied left hepatic lobe with right lobe atrophy.  Small caliber hepatic vein/intrahepatic IVC likely related to changes of cirrhosis.  The main, left and right portal veins appear patent.  The left-sided IVC, anatomic variant.  The splenic vein appears chronically occluded with robust collateral formation including prominent gastric varices.  4.   Atrophic inferior displaced kidney.    EGD 5/1/2019  Impressions:  Z-line, Irregular, 37 to 40 cm from incisors.  esophagogastric landmarks identified  Esophageal mucosal changes classified as Madden's stage C 0-M3 per Proctor criteria.  Grade 2 esophageal varices.  Type I gastroesophageal varices without bleeding  Portal hypertensive gastropathy.  Normal examined duodenum.  No specimen collected    The patient's records and results personally reviewed by this provider.     Outside records reviewed from: care everywhere    ASSESSMENT and PLAN  Marbin is a 68 year old man who is scheduled for possible paracentesis, transjugular intraohepatic portosystemic shunt on 5/16/19 by Dr. Wei in treatment of gastric varices.  PAC referral for risk assessment and optimization for anesthesia with comorbid conditions of HTN, one episode of a fib, allergy induced asthma, anemia, thrombocytopenia, recurent UGIB, alcoholic cirrhosis, portal HTN gastropathy, barretts esophagus, anxiety, history of alcoholism:    Pre-operative considerations:  1.  Cardiac:  Functional status- METS >4.  Intermediate risk surgery with 0.9% (RCRI #) risk of major adverse cardiac event.   ~ HTN - continue on propranolol and hold lisinopril-hydrochlorothiazide DOS. The patient did have a fib x1 at his recent hospitalization. Discussed with his PCP and given his bleeding risk and CHADVASC =2 was not started on any anticoagulation. Continue propranolol.   2.  Pulm:  Airway feasible.  JEANNETTE risk: Intermediate 3/8: HTN, age, male. Patient with allergy induced asthma to cats and dogs, will continue on Zyrtec. Uses albuterol PRN, will bring DOS.   3. Heme: Anemia, thrombocytopenia - Hgb 8.8 and Platelets were 88 on 4/17/19. Will recheck coags, CBC and type and screen today. Last received 2 units of PRBC on 4/3/19.   6. GI:  Risk of PONV score = 1.  If > 2, anti-emetic intervention recommended.  ~ Recurrent UGIB, alcoholic cirrhosis, portal HTN gastropathy,  hoff's esophagus. Repeat EGD on 5/1/19. Will continue on Omeprazole. [facility] by [facility].   8. Psych: anxiety and history of alcoholism - continue on Vistaril, continue with AA and [facility].     VTE risk: 1.8%    Patient was discussed with Dr Galindo.    The patient is optimized for their procedure. AVS with information on surgery time/arrival time, meds and NPO status given by nursing staff.        Arcelia Lawrence PA-C  Preoperative Assessment Center  [facility]  Phone: [phone]  Fax: [phone]

## 2019-05-13 NOTE — ANESTHESIA PREPROCEDURE EVALUATION
Anesthesia Pre-Procedure Evaluation    Patient: Varghese Whitfield   MRN:     [id number] Gender:   male   Age:    68 year old :      1950        Preoperative Diagnosis: Gastric Varices   Procedure(s):  Anesthesia Coverage Possible Paracentesis, Transjugular Intrahepatic Portosystemic Shunt Procedure @1000     Past Medical History:   Diagnosis Date     Asthma      Cirrhosis with alcoholism (H)      HTN (hypertension)      Postherpetic neuralgia      Seizure (H)     Per outside records due to EtOH withdrawal, patient does not remember this      Thrombocytopenia (H)       Past Surgical History:   Procedure Laterality Date     AS LAP,INGUINAL HERNIA REPR,INITIAL       HEMORRHOIDECTOMY       SEPTOPLASTY      repaired for deviation     VASECTOMY            Anesthesia Evaluation     . Pt has had prior anesthetic. Type: General    No history of anesthetic complications          ROS/MED HX    ENT/Pulmonary:     (+)Intermittent asthma Treatment: Inhaler prn,  , . .    Neurologic: Comment: Withdrawal seizure x1 - neg neurologic ROS     Cardiovascular: Comment: A. Fib x1 on 19.     (+) hypertension----. : . . . :. dysrhythmias a-fib, . Previous cardiac testing Echodate:19results:date: results:ECG reviewed date:3/14/13 results:SR date: results:          METS/Exercise Tolerance:  >4 METS   Hematologic:     (+) Anemia, History of Transfusion no previous transfusion reaction Other Hematologic Disorder-thrombocytopenia      (-) history of blood clots   Musculoskeletal:  - neg musculoskeletal ROS       GI/Hepatic:     (+) liver disease, Other GI/Hepatic recurrent UGIB, alcoholic cirrhosis, portal HTN gastropathy, hoff's esophagus      Renal/Genitourinary:  - ROS Renal section negative       Endo:  - neg endo ROS       Psychiatric:     (+) psychiatric history anxiety and other (comment) (History of alcoholism)      Infectious Disease:  - neg infectious disease ROS       Malignancy:      - no malignancy   Other:    (+) no  "H/O Chronic Pain,no other significant disability                        PHYSICAL EXAM:   Mental Status/Neuro: A/A/O; Age Appropriate   Airway: Facies: Feasible  Mallampati: I  Mouth/Opening: Full  TM distance: > 6 cm  Neck ROM: Limited   Respiratory: Auscultation: CTAB     Resp. Rate: Normal     Resp. Effort: Normal      CV: Rhythm: Regular  Heart: Normal Sounds  Pulses: Normal   Comments:      Dental: Normal                  Results for PJ JOSEPH (MRN [id number]) as of 5/13/2019 10:12   Ref. Range 5/13/2019 09:40   WBC Latest Ref Range: 4.0 - 11.0 10e9/L 3.8 (L)   Hemoglobin Latest Ref Range: 13.3 - 17.7 g/dL 11.0 (L)   Hematocrit Latest Ref Range: 40.0 - 53.0 % 35.1 (L)   Platelet Count Latest Ref Range: 150 - 450 10e9/L 68 (L)   RBC Count Latest Ref Range: 4.4 - 5.9 10e12/L 3.88 (L)   MCV Latest Ref Range: 78 - 100 fl 91   MCH Latest Ref Range: 26.5 - 33.0 pg 28.4   MCHC Latest Ref Range: 31.5 - 36.5 g/dL 31.3 (L)   RDW Latest Ref Range: 10.0 - 15.0 % 14.7   INR Latest Ref Range: 0.86 - 1.14  1.22 (H)   PTT Latest Ref Range: 22 - 37 sec 31     Preop Vitals  BP Readings from Last 3 Encounters:   04/15/19 155/78   04/07/19 (P) 129/67    Pulse Readings from Last 3 Encounters:   04/15/19 61   04/07/19 71      Resp Readings from Last 3 Encounters:   04/07/19 (P) 20    SpO2 Readings from Last 3 Encounters:   04/07/19 (P) 97%      Temp Readings from Last 1 Encounters:   04/07/19 (P) 98.4  F (36.9  C) ((P) Oral)    Ht Readings from Last 1 Encounters:   04/05/19 1.778 m (5' 10\")      Wt Readings from Last 1 Encounters:   04/07/19 91.1 kg (200 lb 14.4 oz)    Estimated body mass index is 28.83 kg/m  as calculated from the following:    Height as of 4/5/19: 1.778 m (5' 10\").    Weight as of 4/7/19: 91.1 kg (200 lb 14.4 oz).     LDA:            Assessment:   ASA SCORE: 3       Documentation: H&P complete; Preop Testing complete; Consents complete   Proceeding: Proceed without further delay  Tobacco Use:  NO Active use of " Tobacco/UNKNOWN Tobacco use status     Plan:   Anes. Type:  General   Pre-Induction: Midazolam IV   Induction:  IV (Standard)   Airway: Oral ETT   Access/Monitoring: PIV; 2nd PIV   Maintenance: Balanced; Ketamine   Emergence: Procedure Site   Logistics: Same Day Surgery     Postop Pain/Sedation Strategy:  Standard-Options: Opioids PRN     PONV Management:  Adult Risk Factors:, Non-Smoker, Postop Opioids  Prevention: Ondansetron; Dexamethasone     CONSENT: Direct conversation   Plan and risks discussed with: Patient   Blood Products: Consented (ALL Blood Products)                  PAC Discussion and Assessment    ASA Classification: 3  Case is suitable for: [place]  Anesthetic techniques and relevant risks discussed: GA (GA outside of OR)  Invasive monitoring and risk discussed:   Types:   Possibility and Risk of blood transfusion discussed:   NPO instructions given:   Additional anesthetic preparation and risks discussed:   Needs early admission to pre-op area:   Other:     PAC Resident/NP Anesthesia Assessment:  Marbin is a 68 year old man who is scheduled for possible paracentesis, transjugular intraohepatic portosystemic shunt on 5/16/19 by Dr. Wei in treatment of gastric varices.  PAC referral for risk assessment and optimization for anesthesia with comorbid conditions of HTN, one episode of a fib, allergy induced asthma, anemia, thrombocytopenia, recurent UGIB, alcoholic cirrhosis, portal HTN gastropathy, barretts esophagus, anxiety, history of alcoholism:    Pre-operative considerations:  1.  Cardiac:  Functional status- METS >4.  Intermediate risk surgery with 0.9% (RCRI #) risk of major adverse cardiac event.   ~ HTN - continue on propranolol and hold lisinopril-hydrochlorothiazide DOS. The patient did have a fib x1 at his recent hospitalization. Discussed with his PCP and given his bleeding risk and CHADVASC =2 was not started on any anticoagulation. Continue propranolol.   2.  Pulm:  Airway feasible.   JEANNETTE risk: Intermediate 3/8: HTN, age, male. Patient with allergy induced asthma to cats and dogs, will continue on Zyrtec. Uses albuterol PRN, will bring DOS.   3. Heme: Anemia, thrombocytopenia - Hgb 8.8 and Platelets were 88 on 4/17/19. Will recheck coags, CBC and type and screen today. Last received 2 units of PRBC on 4/3/19.   6. GI:  Risk of PONV score = 1.  If > 2, anti-emetic intervention recommended.  ~ Recurrent UGIB, alcoholic cirrhosis, portal HTN gastropathy, hoff's esophagus. Repeat EGD on 5/1/19. Will continue on Omeprazole. [facility].   8. Psych: anxiety and history of alcoholism - continue on Vistaril, continue with AA and [facility].     VTE risk: 1.8%    Patient is optimized and is acceptable candidate for the proposed procedure.  No further diagnostic evaluation is needed.     Patient discussed with Dr. Galindo     For further details of assessment, testing, and physical exam please see H and P completed on same date.    Arcelia Lawrence PA-C      The patient's labs came back and while hgb was improved platelets were at 68. This was discussed with Dr. Galindo and Dr. Wei was notified of the results. The patient did get a type and screen today so platelets would be available if needed. A recheck platelets was placed for the morning of his surgery.           Mid-Level Provider/Resident: Arcelia Lawrence PA-C  Date: 5/13/19  Time:     Attending Anesthesiologist Anesthesia Assessment:        Anesthesiologist:   Date:   Time:   Pass/Fail:   Disposition:     PAC Pharmacist Assessment:        Pharmacist:   Date:   Time:        Arcelia Lawrence PA-C

## 2019-05-16 ENCOUNTER — APPOINTMENT (OUTPATIENT)
Dept: INTERVENTIONAL RADIOLOGY/VASCULAR | Facility: CLINIC | Age: 69
DRG: 406 | End: 2019-05-16
Attending: RADIOLOGY
Payer: COMMERCIAL

## 2019-05-16 ENCOUNTER — HOSPITAL ENCOUNTER (INPATIENT)
Facility: CLINIC | Age: 69
LOS: 1 days | Discharge: HOME | DRG: 406 | End: 2019-05-16
Attending: ANESTHESIOLOGY | Admitting: RADIOLOGY
Payer: COMMERCIAL

## 2019-05-16 ENCOUNTER — ANESTHESIA (OUTPATIENT)
Dept: SURGERY | Facility: CLINIC | Age: 69
DRG: 406 | End: 2019-05-16
Payer: COMMERCIAL

## 2019-05-16 VITALS
TEMPERATURE: 97.7 F | HEART RATE: 57 BPM | DIASTOLIC BLOOD PRESSURE: 70 MMHG | SYSTOLIC BLOOD PRESSURE: 135 MMHG | HEIGHT: 70 IN | WEIGHT: 183.86 LBS | BODY MASS INDEX: 26.32 KG/M2 | OXYGEN SATURATION: 98 % | RESPIRATION RATE: 14 BRPM

## 2019-05-16 DIAGNOSIS — Z87.19 HISTORY OF GI BLEED: ICD-10-CM

## 2019-05-16 DIAGNOSIS — I86.4 GASTRIC VARICES: ICD-10-CM

## 2019-05-16 PROBLEM — K76.6 PORTAL HYPERTENSION (H): Status: ACTIVE | Noted: 2019-05-16

## 2019-05-16 LAB
ABO + RH BLD: NORMAL
ALBUMIN SERPL-MCNC: 3.4 G/DL (ref 3.4–5)
ALP SERPL-CCNC: 96 U/L (ref 40–150)
ALT SERPL W P-5'-P-CCNC: 22 U/L (ref 0–70)
ANION GAP SERPL CALCULATED.3IONS-SCNC: 8 MMOL/L (ref 3–14)
APTT PPP: 33 SEC (ref 22–37)
AST SERPL W P-5'-P-CCNC: 16 U/L (ref 0–45)
BASOPHILS # BLD AUTO: 0 10E9/L (ref 0–0.2)
BASOPHILS NFR BLD AUTO: 0.3 %
BILIRUB SERPL-MCNC: 0.8 MG/DL (ref 0.2–1.3)
BLD GP AB SCN SERPL QL: NORMAL
BLD GP AB SCN SERPL QL: NORMAL
BLD PROD TYP BPU: NORMAL
BLOOD BANK CMNT PATIENT-IMP: NORMAL
BLOOD BANK CMNT PATIENT-IMP: NORMAL
BUN SERPL-MCNC: 19 MG/DL (ref 7–30)
CALCIUM SERPL-MCNC: 9.5 MG/DL (ref 8.5–10.1)
CHLORIDE SERPL-SCNC: 103 MMOL/L (ref 94–109)
CO2 SERPL-SCNC: 27 MMOL/L (ref 20–32)
CREAT SERPL-MCNC: 0.92 MG/DL (ref 0.66–1.25)
DIFFERENTIAL METHOD BLD: ABNORMAL
EOSINOPHIL # BLD AUTO: 0.1 10E9/L (ref 0–0.7)
EOSINOPHIL NFR BLD AUTO: 2.5 %
ERYTHROCYTE [DISTWIDTH] IN BLOOD BY AUTOMATED COUNT: 14.8 % (ref 10–15)
GFR SERPL CREATININE-BSD FRML MDRD: 85 ML/MIN/{1.73_M2}
GLUCOSE BLDC GLUCOMTR-MCNC: 96 MG/DL (ref 70–99)
GLUCOSE SERPL-MCNC: 112 MG/DL (ref 70–99)
HCT VFR BLD AUTO: 34.9 % (ref 40–53)
HGB BLD-MCNC: 10.3 G/DL (ref 13.3–17.7)
IMM GRANULOCYTES # BLD: 0 10E9/L (ref 0–0.4)
IMM GRANULOCYTES NFR BLD: 0.3 %
INR PPP: 1.2 (ref 0.86–1.14)
LYMPHOCYTES # BLD AUTO: 0.8 10E9/L (ref 0.8–5.3)
LYMPHOCYTES NFR BLD AUTO: 23.3 %
MCH RBC QN AUTO: 27.2 PG (ref 26.5–33)
MCHC RBC AUTO-ENTMCNC: 29.5 G/DL (ref 31.5–36.5)
MCV RBC AUTO: 92 FL (ref 78–100)
MONOCYTES # BLD AUTO: 0.4 10E9/L (ref 0–1.3)
MONOCYTES NFR BLD AUTO: 10 %
NEUTROPHILS # BLD AUTO: 2.3 10E9/L (ref 1.6–8.3)
NEUTROPHILS NFR BLD AUTO: 63.6 %
NRBC # BLD AUTO: 0 10*3/UL
NRBC BLD AUTO-RTO: 0 /100
NUM BPU REQUESTED: 2
PLATELET # BLD AUTO: 74 10E9/L (ref 150–450)
POTASSIUM SERPL-SCNC: 4.4 MMOL/L (ref 3.4–5.3)
PROT SERPL-MCNC: 7.3 G/DL (ref 6.8–8.8)
RBC # BLD AUTO: 3.79 10E12/L (ref 4.4–5.9)
SODIUM SERPL-SCNC: 138 MMOL/L (ref 133–144)
SPECIMEN EXP DATE BLD: NORMAL
SPECIMEN EXP DATE BLD: NORMAL
WBC # BLD AUTO: 3.6 10E9/L (ref 4–11)

## 2019-05-16 PROCEDURE — 27210845 ZZH DEVICE INFLATION CR5

## 2019-05-16 PROCEDURE — 85025 COMPLETE CBC W/AUTO DIFF WBC: CPT | Performed by: RADIOLOGY

## 2019-05-16 PROCEDURE — 25000565 ZZH ISOFLURANE, EA 15 MIN

## 2019-05-16 PROCEDURE — 85610 PROTHROMBIN TIME: CPT | Performed by: RADIOLOGY

## 2019-05-16 PROCEDURE — 37000008 ZZH ANESTHESIA TECHNICAL FEE, 1ST 30 MIN

## 2019-05-16 PROCEDURE — 25000128 H RX IP 250 OP 636: Performed by: NURSE ANESTHETIST, CERTIFIED REGISTERED

## 2019-05-16 PROCEDURE — 86901 BLOOD TYPING SEROLOGIC RH(D): CPT | Performed by: RADIOLOGY

## 2019-05-16 PROCEDURE — 27210905 ZZH KIT CR7

## 2019-05-16 PROCEDURE — C1887 CATHETER, GUIDING: HCPCS

## 2019-05-16 PROCEDURE — 25000128 H RX IP 250 OP 636: Performed by: RADIOLOGY

## 2019-05-16 PROCEDURE — 00000146 ZZHCL STATISTIC GLUCOSE BY METER IP

## 2019-05-16 PROCEDURE — 40000170 ZZH STATISTIC PRE-PROCEDURE ASSESSMENT II

## 2019-05-16 PROCEDURE — 71000027 ZZH RECOVERY PHASE 2 EACH 15 MINS

## 2019-05-16 PROCEDURE — 25000125 ZZHC RX 250: Performed by: RADIOLOGY

## 2019-05-16 PROCEDURE — 85730 THROMBOPLASTIN TIME PARTIAL: CPT | Performed by: RADIOLOGY

## 2019-05-16 PROCEDURE — C1769 GUIDE WIRE: HCPCS

## 2019-05-16 PROCEDURE — 36415 COLL VENOUS BLD VENIPUNCTURE: CPT | Performed by: RADIOLOGY

## 2019-05-16 PROCEDURE — 37182 INSERT HEPATIC SHUNT (TIPS): CPT

## 2019-05-16 PROCEDURE — 27210889 ZZH ACCESSORY CR8

## 2019-05-16 PROCEDURE — 27210908 ZZH NEEDLE CR4

## 2019-05-16 PROCEDURE — 86900 BLOOD TYPING SEROLOGIC ABO: CPT | Performed by: RADIOLOGY

## 2019-05-16 PROCEDURE — 86850 RBC ANTIBODY SCREEN: CPT | Performed by: RADIOLOGY

## 2019-05-16 PROCEDURE — C1725 CATH, TRANSLUMIN NON-LASER: HCPCS

## 2019-05-16 PROCEDURE — 25000128 H RX IP 250 OP 636: Performed by: ANESTHESIOLOGY

## 2019-05-16 PROCEDURE — C1874 STENT, COATED/COV W/DEL SYS: HCPCS

## 2019-05-16 PROCEDURE — 27210732 ZZH ACCESSORY CR1

## 2019-05-16 PROCEDURE — 27211039 ZZH NEEDLE CR2

## 2019-05-16 PROCEDURE — 06183J4 BYPASS PORTAL VEIN TO HEPATIC VEIN WITH SYNTHETIC SUBSTITUTE, PERCUTANEOUS APPROACH: ICD-10-PCS | Performed by: RADIOLOGY

## 2019-05-16 PROCEDURE — 80053 COMPREHEN METABOLIC PANEL: CPT | Performed by: RADIOLOGY

## 2019-05-16 PROCEDURE — 25000125 ZZHC RX 250: Performed by: NURSE ANESTHETIST, CERTIFIED REGISTERED

## 2019-05-16 PROCEDURE — 25800030 ZZH RX IP 258 OP 636: Performed by: NURSE ANESTHETIST, CERTIFIED REGISTERED

## 2019-05-16 PROCEDURE — 37000009 ZZH ANESTHESIA TECHNICAL FEE, EACH ADDTL 15 MIN

## 2019-05-16 PROCEDURE — 25500064 ZZH RX 255 OP 636: Performed by: ANESTHESIOLOGY

## 2019-05-16 PROCEDURE — 12000001 ZZH R&B MED SURG/OB UMMC

## 2019-05-16 PROCEDURE — 71000012 ZZH RECOVERY PHASE 1 LEVEL 1 FIRST HR

## 2019-05-16 RX ORDER — ONDANSETRON 4 MG/1
4 TABLET, ORALLY DISINTEGRATING ORAL EVERY 30 MIN PRN
Status: DISCONTINUED | OUTPATIENT
Start: 2019-05-16 | End: 2019-06-26 | Stop reason: HOSPADM

## 2019-05-16 RX ORDER — DEXTROSE MONOHYDRATE 25 G/50ML
25-50 INJECTION, SOLUTION INTRAVENOUS
Status: DISCONTINUED | OUTPATIENT
Start: 2019-05-16 | End: 2019-06-26 | Stop reason: HOSPADM

## 2019-05-16 RX ORDER — ONDANSETRON 2 MG/ML
4 INJECTION INTRAMUSCULAR; INTRAVENOUS EVERY 30 MIN PRN
Status: DISCONTINUED | OUTPATIENT
Start: 2019-05-16 | End: 2019-06-26 | Stop reason: HOSPADM

## 2019-05-16 RX ORDER — DEXTROSE MONOHYDRATE 25 G/50ML
25-50 INJECTION, SOLUTION INTRAVENOUS
Status: DISCONTINUED | OUTPATIENT
Start: 2019-05-16 | End: 2019-05-16 | Stop reason: HOSPADM

## 2019-05-16 RX ORDER — CLINDAMYCIN PHOSPHATE 900 MG/50ML
900 INJECTION, SOLUTION INTRAVENOUS
Status: COMPLETED | OUTPATIENT
Start: 2019-05-16 | End: 2019-05-16

## 2019-05-16 RX ORDER — MEPERIDINE HYDROCHLORIDE 25 MG/ML
12.5 INJECTION INTRAMUSCULAR; INTRAVENOUS; SUBCUTANEOUS
Status: DISCONTINUED | OUTPATIENT
Start: 2019-05-16 | End: 2019-06-26 | Stop reason: HOSPADM

## 2019-05-16 RX ORDER — HYDROMORPHONE HYDROCHLORIDE 1 MG/ML
.3-.5 INJECTION, SOLUTION INTRAMUSCULAR; INTRAVENOUS; SUBCUTANEOUS EVERY 10 MIN PRN
Status: DISCONTINUED | OUTPATIENT
Start: 2019-05-16 | End: 2019-06-26 | Stop reason: HOSPADM

## 2019-05-16 RX ORDER — SODIUM CHLORIDE, SODIUM LACTATE, POTASSIUM CHLORIDE, CALCIUM CHLORIDE 600; 310; 30; 20 MG/100ML; MG/100ML; MG/100ML; MG/100ML
INJECTION, SOLUTION INTRAVENOUS CONTINUOUS PRN
Status: DISCONTINUED | OUTPATIENT
Start: 2019-05-16 | End: 2019-05-16

## 2019-05-16 RX ORDER — IODIXANOL 320 MG/ML
150 INJECTION, SOLUTION INTRAVASCULAR ONCE
Status: COMPLETED | OUTPATIENT
Start: 2019-05-16 | End: 2019-05-16

## 2019-05-16 RX ORDER — NALOXONE HYDROCHLORIDE 0.4 MG/ML
.1-.4 INJECTION, SOLUTION INTRAMUSCULAR; INTRAVENOUS; SUBCUTANEOUS
Status: DISCONTINUED | OUTPATIENT
Start: 2019-05-16 | End: 2019-05-17

## 2019-05-16 RX ORDER — SODIUM CHLORIDE, SODIUM LACTATE, POTASSIUM CHLORIDE, CALCIUM CHLORIDE 600; 310; 30; 20 MG/100ML; MG/100ML; MG/100ML; MG/100ML
INJECTION, SOLUTION INTRAVENOUS CONTINUOUS
Status: DISCONTINUED | OUTPATIENT
Start: 2019-05-16 | End: 2019-06-26 | Stop reason: HOSPADM

## 2019-05-16 RX ORDER — SODIUM CHLORIDE 9 MG/ML
INJECTION, SOLUTION INTRAVENOUS CONTINUOUS
Status: DISCONTINUED | OUTPATIENT
Start: 2019-05-16 | End: 2019-05-16 | Stop reason: HOSPADM

## 2019-05-16 RX ORDER — LIDOCAINE HYDROCHLORIDE 10 MG/ML
1-30 INJECTION, SOLUTION EPIDURAL; INFILTRATION; INTRACAUDAL; PERINEURAL
Status: COMPLETED | OUTPATIENT
Start: 2019-05-16 | End: 2019-05-16

## 2019-05-16 RX ORDER — ONDANSETRON 2 MG/ML
INJECTION INTRAMUSCULAR; INTRAVENOUS PRN
Status: DISCONTINUED | OUTPATIENT
Start: 2019-05-16 | End: 2019-05-16

## 2019-05-16 RX ORDER — FENTANYL CITRATE 50 UG/ML
25-50 INJECTION, SOLUTION INTRAMUSCULAR; INTRAVENOUS EVERY 5 MIN PRN
Status: DISCONTINUED | OUTPATIENT
Start: 2019-05-16 | End: 2019-05-16 | Stop reason: HOSPADM

## 2019-05-16 RX ORDER — PROPOFOL 10 MG/ML
INJECTION, EMULSION INTRAVENOUS PRN
Status: DISCONTINUED | OUTPATIENT
Start: 2019-05-16 | End: 2019-05-16

## 2019-05-16 RX ORDER — HEPARIN SOD,PORCINE/0.9 % NACL 5K/1000 ML
1000-10000 INTRAVENOUS SOLUTION INTRAVENOUS
Status: COMPLETED | OUTPATIENT
Start: 2019-05-16 | End: 2019-05-16

## 2019-05-16 RX ORDER — GLYCOPYRROLATE 0.2 MG/ML
INJECTION, SOLUTION INTRAMUSCULAR; INTRAVENOUS PRN
Status: DISCONTINUED | OUTPATIENT
Start: 2019-05-16 | End: 2019-05-16

## 2019-05-16 RX ORDER — FENTANYL CITRATE 50 UG/ML
INJECTION, SOLUTION INTRAMUSCULAR; INTRAVENOUS PRN
Status: DISCONTINUED | OUTPATIENT
Start: 2019-05-16 | End: 2019-05-16

## 2019-05-16 RX ORDER — NALOXONE HYDROCHLORIDE 0.4 MG/ML
.1-.4 INJECTION, SOLUTION INTRAMUSCULAR; INTRAVENOUS; SUBCUTANEOUS
Status: DISCONTINUED | OUTPATIENT
Start: 2019-05-16 | End: 2019-05-17 | Stop reason: HOSPADM

## 2019-05-16 RX ORDER — DEXAMETHASONE SODIUM PHOSPHATE 4 MG/ML
INJECTION, SOLUTION INTRA-ARTICULAR; INTRALESIONAL; INTRAMUSCULAR; INTRAVENOUS; SOFT TISSUE PRN
Status: DISCONTINUED | OUTPATIENT
Start: 2019-05-16 | End: 2019-05-16

## 2019-05-16 RX ORDER — LIDOCAINE 40 MG/G
CREAM TOPICAL
Status: DISCONTINUED | OUTPATIENT
Start: 2019-05-16 | End: 2019-05-16 | Stop reason: HOSPADM

## 2019-05-16 RX ORDER — LIDOCAINE HYDROCHLORIDE 20 MG/ML
INJECTION, SOLUTION INFILTRATION; PERINEURAL PRN
Status: DISCONTINUED | OUTPATIENT
Start: 2019-05-16 | End: 2019-05-16

## 2019-05-16 RX ORDER — NICOTINE POLACRILEX 4 MG
15-30 LOZENGE BUCCAL
Status: DISCONTINUED | OUTPATIENT
Start: 2019-05-16 | End: 2019-06-26 | Stop reason: HOSPADM

## 2019-05-16 RX ORDER — NICOTINE POLACRILEX 4 MG
15-30 LOZENGE BUCCAL
Status: DISCONTINUED | OUTPATIENT
Start: 2019-05-16 | End: 2019-05-16 | Stop reason: HOSPADM

## 2019-05-16 RX ADMIN — GLYCOPYRROLATE 0.2 MG: 0.2 INJECTION, SOLUTION INTRAMUSCULAR; INTRAVENOUS at 12:12

## 2019-05-16 RX ADMIN — FENTANYL CITRATE 100 MCG: 50 INJECTION, SOLUTION INTRAMUSCULAR; INTRAVENOUS at 11:35

## 2019-05-16 RX ADMIN — ONDANSETRON 4 MG: 2 INJECTION INTRAMUSCULAR; INTRAVENOUS at 14:25

## 2019-05-16 RX ADMIN — SUGAMMADEX 200 MG: 100 INJECTION, SOLUTION INTRAVENOUS at 13:46

## 2019-05-16 RX ADMIN — LIDOCAINE HYDROCHLORIDE 100 MG: 20 INJECTION, SOLUTION INFILTRATION; PERINEURAL at 11:35

## 2019-05-16 RX ADMIN — SODIUM CHLORIDE, POTASSIUM CHLORIDE, SODIUM LACTATE AND CALCIUM CHLORIDE: 600; 310; 30; 20 INJECTION, SOLUTION INTRAVENOUS at 11:23

## 2019-05-16 RX ADMIN — ROCURONIUM BROMIDE 50 MG: 10 INJECTION INTRAVENOUS at 11:35

## 2019-05-16 RX ADMIN — IODIXANOL 75 ML: 320 INJECTION, SOLUTION INTRAVASCULAR at 13:56

## 2019-05-16 RX ADMIN — LIDOCAINE HYDROCHLORIDE 15 ML: 10 INJECTION, SOLUTION EPIDURAL; INFILTRATION; INTRACAUDAL; PERINEURAL at 14:04

## 2019-05-16 RX ADMIN — FENTANYL CITRATE 50 MCG: 50 INJECTION, SOLUTION INTRAMUSCULAR; INTRAVENOUS at 12:55

## 2019-05-16 RX ADMIN — PROPOFOL 110 MG: 10 INJECTION, EMULSION INTRAVENOUS at 11:35

## 2019-05-16 RX ADMIN — MIDAZOLAM 1 MG: 1 INJECTION INTRAMUSCULAR; INTRAVENOUS at 11:35

## 2019-05-16 RX ADMIN — ROCURONIUM BROMIDE 20 MG: 10 INJECTION INTRAVENOUS at 12:15

## 2019-05-16 RX ADMIN — Medication 5000 UNITS: at 14:03

## 2019-05-16 RX ADMIN — MIDAZOLAM 1 MG: 1 INJECTION INTRAMUSCULAR; INTRAVENOUS at 11:27

## 2019-05-16 RX ADMIN — DEXAMETHASONE SODIUM PHOSPHATE 8 MG: 4 INJECTION, SOLUTION INTRA-ARTICULAR; INTRALESIONAL; INTRAMUSCULAR; INTRAVENOUS; SOFT TISSUE at 11:40

## 2019-05-16 RX ADMIN — ROCURONIUM BROMIDE 10 MG: 10 INJECTION INTRAVENOUS at 12:55

## 2019-05-16 RX ADMIN — Medication 0.3 MG: at 15:09

## 2019-05-16 RX ADMIN — FENTANYL CITRATE 50 MCG: 50 INJECTION, SOLUTION INTRAMUSCULAR; INTRAVENOUS at 12:41

## 2019-05-16 RX ADMIN — CLINDAMYCIN PHOSPHATE 900 MG: 18 INJECTION, SOLUTION INTRAVENOUS at 12:00

## 2019-05-16 RX ADMIN — ONDANSETRON 4 MG: 2 INJECTION INTRAMUSCULAR; INTRAVENOUS at 11:40

## 2019-05-16 ASSESSMENT — MIFFLIN-ST. JEOR: SCORE: 1615.25

## 2019-05-16 NOTE — ANESTHESIA POSTPROCEDURE EVALUATION
Anesthesia POST Procedure Evaluation    Patient: Varghese Whitfield   MRN:     [id number] Gender:   male   Age:    68 year old :      1950        Preoperative Diagnosis: Gastric Varices   Procedure(s):  Anesthesia Coverage Possible Paracentesis, Transjugular Intrahepatic Portosystemic Shunt Procedure @1000   Postop Comments: No value filed.       Anesthesia Type:  General  General    Reportable Event: NO     PAIN: Uncomplicated   Sign Out status: Comfortable, Well controlled pain     PONV: No PONV   Sign Out status:  No Nausea or Vomiting     Neuro/Psych: Uneventful perioperative course   Sign Out Status: Preoperative baseline; Age appropriate mentation     Airway/Resp.: Uneventful perioperative course   Sign Out Status: Non labored breathing, age appropriate RR; Resp. Status within EXPECTED Parameters     CV: Uneventful perioperative course   Sign Out status: Appropriate BP and perfusion indices; Appropriate HR/Rhythm     Disposition:   Sign Out in:  PACU  Disposition:  Floor  Recovery Course: Uneventful  Follow-Up: Not required           Last Anesthesia Record Vitals:  CRNA VITALS  2019 1321 - 2019 1421      2019             NIBP:  159/79    Pulse:  67    SpO2:  100 %    Resp Rate (observed):  16          Last PACU Vitals:  Vitals Value Taken Time   /72 2019  4:10 PM   Temp 36.7  C (98  F) 2019  3:00 PM   Pulse 61 2019  4:10 PM   Resp 12 2019  4:00 PM   SpO2 98 % 2019  4:13 PM   Temp src     NIBP 159/79 2019  2:04 PM   Pulse 67 2019  2:04 PM   SpO2 100 % 2019  2:04 PM   Resp     Temp     Ht Rate     Temp 2     Vitals shown include unvalidated device data.      Electronically Signed By: Farhana Rogers MD, May 16, 2019, 4:17 PM

## 2019-05-16 NOTE — PROGRESS NOTES
Pre Pressure RA- 13/11-12  Post Pressures  Main Portal Vein- 30/28- 29  Portal Venous End of Stent- 29/26-27  Mid Stent- 24/21-23  RA- 22/17-19

## 2019-05-16 NOTE — OR NURSING
"Writer spoke with IR resident. IR resident stated that the patient  \"may be able to discharge today.\" Writer stated that in order to leave PACU, orders must be in place for either \"admit to inpatient\" or \"discharge patient.\"  IR resident verbalized his understanding. Awaiting orders at this time.   "

## 2019-05-16 NOTE — OR NURSING
"Active order in place \"discharge patient\" and order also in place \"admit to inpatient.\"  Writer spoke with Dr. José Miguel Pineda, he stated,\"He's okay to discharge home, the admit to inpatient order is mandatory for every patient receiving a TIPS procedure.\" Ok to proceed with discharge home once pt voids.   "

## 2019-05-16 NOTE — OR NURSING
Writer contacted IR resident for brief op note and admit order at this time. Awaiting orders and note.

## 2019-05-16 NOTE — OR NURSING
Dr. Wei at bedside in PACU, he stated writer may discontinue corona catheter, and that pt will discharge home this evening from Phase II. Awaiting orders at this time.

## 2019-05-16 NOTE — PROGRESS NOTES
Patient Name: Varghese Whitfield  Medical Record Number: [id number]  Today's Date: 5/16/2019    Procedure: TIPS placement  Proceduralist: Dr. Miriam Perales    Procedure start time: 1215  Puncture time: 1220    Other Notes: Pt arrived to IR room 5 from . Consent reviewed. Pt denies any questions or concerns regarding procedure. Pt positioned supine and monitored per anesthesia.

## 2019-05-16 NOTE — OR NURSING
Paged Dr. Pineda at [phone] because IR did not place any discharge instructions in the AVS, and needed instructions on activity restrictions and showering and dressing changes. Pt was able to void a small amount in the Bathroom.

## 2019-05-16 NOTE — IR NOTE
Interventional Radiology Brief Post Procedure Note    Procedure: IR TRANSVEN INTRAHEPATIC PORTOSYST SHUNT, IR PARACENTESIS    Proceduralist: José Miguel Pineda MD and Gabe Wei MD    Assistant: None    Time Out: Prior to the start of the procedure and with procedural staff participation, I verbally confirmed the patient s identity using two indicators, relevant allergies, that the procedure was appropriate and matched the consent or emergent situation, and that the correct equipment/implants were available. Immediately prior to starting the procedure I conducted the Time Out with the procedural staff and re-confirmed the patient s name, procedure, and site/side. (The Joint Commission universal protocol was followed.)  Yes        Sedation: General Endotracheal Anesthesia (GET) administered and documented by Anesthesia Care Provider    Findings: TIPS placement with flouro and US guidance. Brief attempt at splenic vein recanalization, trace extravasation from tiny splnic vein branch.     Estimated Blood Loss: Less than 10 ml    Fluoroscopy Time:  minute(s)    SPECIMENS: None    Complications: 1. None      Condition: Stable    Plan:   -To PACU to recover from GETA   -If no complications arise patient can discharge at 1600.   -Pt will follow up in clinic with Dr. Wei in 1 month with TIPS doppler US.     Comments: See dictated procedure note for full details.    José Miguel Pineda MD

## 2019-05-16 NOTE — ANESTHESIA CARE TRANSFER NOTE
Patient: Varghese Whitfield    Procedure(s):  Anesthesia Coverage Possible Paracentesis, Transjugular Intrahepatic Portosystemic Shunt Procedure @1000    Diagnosis: Gastric Varices  Diagnosis Additional Information: No value filed.    Anesthesia Type:   No value filed.     Note:  Airway :Face Mask  Patient transferred to:PACU  Comments: Pt denies pain or nausea. Report given to RN. Handoff Report: Identifed the Patient, Identified the Reponsible Provider, Reviewed the pertinent medical history, Discussed the surgical course, Reviewed Intra-OP anesthesia mangement and issues during anesthesia, Set expectations for post-procedure period and Allowed opportunity for questions and acknowledgement of understanding      Vitals: (Last set prior to Anesthesia Care Transfer)    CRNA VITALS  5/16/2019 1321 - 5/16/2019 1404      5/16/2019             NIBP:  159/79    Pulse:  67    SpO2:  100 %    Resp Rate (observed):  16                Electronically Signed By: LEE Sheikh CRNA  May 16, 2019  2:04 PM

## 2019-05-17 LAB
BLD PROD TYP BPU: NORMAL
BLD PROD TYP BPU: NORMAL
BLD UNIT ID BPU: 0
BLD UNIT ID BPU: 0
BLOOD PRODUCT CODE: NORMAL
BLOOD PRODUCT CODE: NORMAL
BPU ID: NORMAL
BPU ID: NORMAL
TRANSFUSION STATUS PATIENT QL: NORMAL

## 2019-05-17 NOTE — DISCHARGE INSTRUCTIONS
1. No heavy lifting for 2 days (no greater than 10 pounds)    2. Resume usual diet    3. Can shower tomorrow    4. Dressing can come off at time of next shower    5. .Interventional Radiology will call you and arrange your follow up        [facility]  Same-Day Surgery   Adult Discharge Orders & Instructions     For 24 hours after surgery    1. Get plenty of rest.  A responsible adult must stay with you for at least 24 hours after you leave the hospital.   2. Do not drive or use heavy equipment.  If you have weakness or tingling, don't drive or use heavy equipment until this feeling goes away.  3. Do not drink alcohol.  4. Avoid strenuous or risky activities.  Ask for help when climbing stairs.   5. You may feel lightheaded.  IF so, sit for a few minutes before standing.  Have someone help you get up.   6. If you have nausea (feel sick to your stomach): Drink only clear liquids such as apple juice, ginger ale, broth or 7-Up.  Rest may also help.  Be sure to drink enough fluids.  Move to a regular diet as you feel able.  7. You may have a slight fever. Call the doctor if your fever is over 100 F (37.7 C) (taken under the tongue) or lasts longer than 24 hours.  8. You may have a dry mouth, a sore throat, muscle aches or trouble sleeping.  These should go away after 24 hours.  9. Do not make important or legal decisions.   Call your doctor for any of the followin.  Signs of infection (fever, growing tenderness at the surgery site, a large amount of drainage or bleeding, severe pain, foul-smelling drainage, redness, swelling).    2. It has been over 8 to 10 hours since surgery and you are still not able to urinate (pass water).    3.  Headache for over 24 hours.    To contact a doctor during clinic hours, call Interventional Radiology at [phone] or [phone]   or:        After hours/nights/weekends: [phone] and ask for the resident on call for Interventional  Radiology (answered 24 hours a day)      Emergency Department:    [facility]: [phone]       (TTY for hearing impaired: [phone])

## 2019-05-20 ENCOUNTER — TELEPHONE (OUTPATIENT)
Dept: VASCULAR SURGERY | Facility: CLINIC | Age: 69
End: 2019-05-20

## 2019-05-20 DIAGNOSIS — Z95.828 S/P TIPS (TRANSJUGULAR INTRAHEPATIC PORTOSYSTEMIC SHUNT): Primary | ICD-10-CM

## 2019-05-20 NOTE — TELEPHONE ENCOUNTER
I called and spoke with Marbin.  He states the first few days after his TIPS procedure had slight nausea, stomach ache.  He had some difficulty voiding after her returned home.  He drank extra fluid and finally has been able to void no problems. He didn't take any medications, except tylenol on Sat.  Pt is eating and drinking well.  States he feels well now.  We discussed follow up for his TIPS will be at 1 month with an US and to see Dr Wei in clinic. All questions answered.  MARCK Velasco, RN, BSN  Interventional Radiology Nurse Coordinator   Phone:  [phone]

## 2019-07-01 ENCOUNTER — ANCILLARY PROCEDURE (OUTPATIENT)
Dept: ULTRASOUND IMAGING | Facility: CLINIC | Age: 69
End: 2019-07-01
Payer: COMMERCIAL

## 2019-07-01 ENCOUNTER — OFFICE VISIT (OUTPATIENT)
Dept: VASCULAR SURGERY | Facility: CLINIC | Age: 69
End: 2019-07-01
Payer: COMMERCIAL

## 2019-07-01 VITALS — HEART RATE: 60 BPM | DIASTOLIC BLOOD PRESSURE: 67 MMHG | OXYGEN SATURATION: 100 % | SYSTOLIC BLOOD PRESSURE: 147 MMHG

## 2019-07-01 DIAGNOSIS — Z95.828 S/P TIPS (TRANSJUGULAR INTRAHEPATIC PORTOSYSTEMIC SHUNT): ICD-10-CM

## 2019-07-01 DIAGNOSIS — I86.4 GASTRIC VARICES: ICD-10-CM

## 2019-07-01 DIAGNOSIS — Z95.828 S/P TIPS (TRANSJUGULAR INTRAHEPATIC PORTOSYSTEMIC SHUNT): Primary | ICD-10-CM

## 2019-07-01 DIAGNOSIS — Z87.19 HISTORY OF GI BLEED: ICD-10-CM

## 2019-07-01 LAB
ALBUMIN SERPL-MCNC: 2.5 G/DL (ref 3.4–5)
ALP SERPL-CCNC: 112 U/L (ref 40–150)
ALT SERPL W P-5'-P-CCNC: 22 U/L (ref 0–70)
ANION GAP SERPL CALCULATED.3IONS-SCNC: 6 MMOL/L (ref 3–14)
AST SERPL W P-5'-P-CCNC: 26 U/L (ref 0–45)
BILIRUB SERPL-MCNC: 1.8 MG/DL (ref 0.2–1.3)
BUN SERPL-MCNC: 14 MG/DL (ref 7–30)
CALCIUM SERPL-MCNC: 8.7 MG/DL (ref 8.5–10.1)
CHLORIDE SERPL-SCNC: 107 MMOL/L (ref 94–109)
CO2 SERPL-SCNC: 26 MMOL/L (ref 20–32)
CREAT SERPL-MCNC: 1.03 MG/DL (ref 0.66–1.25)
ERYTHROCYTE [DISTWIDTH] IN BLOOD BY AUTOMATED COUNT: 18.1 % (ref 10–15)
GFR SERPL CREATININE-BSD FRML MDRD: 74 ML/MIN/{1.73_M2}
GLUCOSE SERPL-MCNC: 113 MG/DL (ref 70–99)
HCT VFR BLD AUTO: 32.5 % (ref 40–53)
HGB BLD-MCNC: 10.2 G/DL (ref 13.3–17.7)
INR PPP: 1.62 (ref 0.86–1.14)
MCH RBC QN AUTO: 27.8 PG (ref 26.5–33)
MCHC RBC AUTO-ENTMCNC: 31.4 G/DL (ref 31.5–36.5)
MCV RBC AUTO: 89 FL (ref 78–100)
PLATELET # BLD AUTO: 55 10E9/L (ref 150–450)
POTASSIUM SERPL-SCNC: 3.7 MMOL/L (ref 3.4–5.3)
PROT SERPL-MCNC: 5.7 G/DL (ref 6.8–8.8)
RBC # BLD AUTO: 3.67 10E12/L (ref 4.4–5.9)
SODIUM SERPL-SCNC: 138 MMOL/L (ref 133–144)
WBC # BLD AUTO: 3.3 10E9/L (ref 4–11)

## 2019-07-01 ASSESSMENT — PAIN SCALES - GENERAL: PAINLEVEL: NO PAIN (0)

## 2019-07-01 NOTE — PATIENT INSTRUCTIONS
Preventive Care:    Colorectal Cancer Screening: During our visit today, we discussed that it is recommended you receive colorectal cancer screening. Please call or make an appointment with your primary care provider to discuss this. You may also call the [facility] scheduling line ([phone]) to set up a colonoscopy appointment.

## 2019-07-01 NOTE — PROGRESS NOTES
INTERVENTIONAL RADIOLOGY CONSULTATION    Name: Varghese Whitfield  Age: 68 year old   Referring Physician: Dr. Deleon   REASON FOR REFERRAL: Variceal bleeding    HPI: Mr. Whitfield is a pleasant 68 year old male who presented in May from outside hospital due to gastric variceal bleeding in the setting of cirrhosis with portal hypertension and splenic vein thrombosis. Trans-jugular Intrahepatic Portosystemic Shunting was performed as an outpatient near the 1 month ago. Recanalization of the splenic vein was attempted via a trans-jugular approach through the TIPS, but this was not successful. Patient states he has been doing very well since this procedure. No further episodes of bleeding. No changes in mentation. Reports very mild bilateral lower extremity swelling.  Overall both patient and his wife expressed satisfaction with the course of postprocedural episode.    PAST MEDICAL HISTORY:   Past Medical History:   Diagnosis Date     Anemia      Anxiety      Asthma     allergy related     Madden's esophagus      Cirrhosis with alcoholism (H)      HTN (hypertension)      Postherpetic neuralgia      Seizure (H)     Per outside records due to EtOH withdrawal, patient does not remember this      Thrombocytopenia (H)      UGIB (upper gastrointestinal bleed)        PAST SURGICAL HISTORY:   Past Surgical History:   Procedure Laterality Date     AS ESOPHAGOSCOPY, DIAGNOSTIC       HEMORRHOIDECTOMY       IR TRANSVEN INTRAHEPATIC PORTOSYST SHUNT  5/16/2019     SEPTOPLASTY      repaired for deviation     VASECTOMY         FAMILY HISTORY:   Family History   Problem Relation Age of Onset     Other - See Comments Mother         passed away in her sleep     Myocardial Infarction Father      Alcoholism Father      No Known Problems Sister        SOCIAL HISTORY:   Social History     Tobacco Use     Smoking status: Never Smoker     Smokeless tobacco: Never Used   Substance Use Topics     Alcohol use: Not Currently     Frequency: 4 or more  times a week     Drinks per session: 3 or 4       PROBLEM LIST:   Patient Active Problem List    Diagnosis Date Noted     Portal hypertension (H) 05/16/2019     Priority: Medium     GIB (gastrointestinal bleeding) 04/05/2019     Priority: Medium     Seizure (H) 04/15/2012     Priority: Medium     Post herpetic neuralgia 04/15/2012     Priority: Medium     Alcohol dependence (H) 04/15/2012     Priority: Medium     Mild intermittent asthma 04/26/2009     Priority: Medium     Madden's esophagus 04/26/2009     Priority: Medium     Thrombocytopenia (H) 04/26/2009     Priority: Medium     Hypertension 08/13/2002     Priority: Medium     Hyperlipidemia 02/01/2001     Priority: Medium     Allergic rhinitis 04/15/1998     Priority: Medium       MEDICATIONS:   Prescription Medications as of 7/1/2019       Rx Number Disp Refills Start End Last Dispensed Date Next Fill Date Owning Pharmacy    acetaminophen (TYLENOL) 500 MG tablet        [facility]    Sig: Take 1,000 mg by mouth as needed for mild pain    Class: Historical    Route: Oral    albuterol (PROVENTIL HFA) 108 (90 Base) MCG/ACT inhaler        [facility]    Sig: Inhale 2 puffs into the lungs as needed    Class: Historical    Route: Inhalation    Ascorbic Acid (VITAMIN C) 500 MG CAPS        [facility]    Sig: Take 500 mg by mouth every evening    Class: Historical    Route: Oral    cetirizine (ZYRTEC) 10 MG tablet            Sig: Take 10 mg by mouth every morning     Class: Historical    Route: Oral    Glucosamine HCl-MSM (GLUCOSAMINE-MSM PO)        [facility]    Sig: Take 1,500 mg by mouth every evening    Class: Historical    Route: Oral    hydrOXYzine (VISTARIL) 50 MG capsule            Sig: Take 50 mg by mouth 2 times daily     Class: Historical    Route: Oral    lisinopril-hydrochlorothiazide (PRINZIDE/ZESTORETIC) 10-12.5 MG tablet            Sig: Take 1 tablet by mouth every morning     Class:  Historical    Route: Oral    Multiple Vitamin (MULTI VITAMIN W/D-3) TABS            Sig: Take 1 tablet by mouth 2 times daily     Class: Historical    Route: Oral    omeprazole (PRILOSEC) 20 MG DR capsule            Sig: Take 20 mg by mouth 2 times daily     Class: Historical    Route: Oral    propranolol (INDERAL) 10 MG tablet            Sig: Take 10 mg by mouth 2 times daily     Class: Historical    Route: Oral          ALLERGIES:   Penicillins      Physical Examination:   VITALS:   /67 (BP Location: Right arm, Patient Position: Chair, Cuff Size: Adult Regular)   Pulse 60   SpO2 100%   Constitutional: Sitting in chair comfortably, alert and no distress  Head: Normocephalic. No masses, lesions, tenderness or abnormalities  : Deferred  Musculoskeletal: extremities normal- no gross deformities noted, gait normal and normal muscle tone  Skin: no suspicious lesions or rashes  Neurologic: Gait normal. Sensation grossly WNL.  Psychiatric: affect normal/bright and mentation appears normal.  Hematologic/Lymphatic/Immunologic: trace to +1 pitting edema b/l    Labs:    BMP RESULTS:  Lab Results   Component Value Date     07/01/2019    POTASSIUM 3.7 07/01/2019    CHLORIDE 107 07/01/2019    CO2 26 07/01/2019    ANIONGAP 6 07/01/2019     (H) 07/01/2019    BUN 14 07/01/2019    CR 1.03 07/01/2019    GFRESTIMATED 74 07/01/2019    GFRESTBLACK 86 07/01/2019    RADHA 8.7 07/01/2019        CBC RESULTS:  Lab Results   Component Value Date    WBC 3.3 (L) 07/01/2019    RBC 3.67 (L) 07/01/2019    HGB 10.2 (L) 07/01/2019    HCT 32.5 (L) 07/01/2019    MCV 89 07/01/2019    MCH 27.8 07/01/2019    MCHC 31.4 (L) 07/01/2019    RDW 18.1 (H) 07/01/2019    PLT 55 (L) 07/01/2019       INR/PTT:  Lab Results   Component Value Date    INR 1.62 (H) 07/01/2019    PTT 33 05/16/2019       Diagnostic studies:   TIPS US 07/01/19  1. Patent TIPS with slightly elevated velocities in the mid and  hepatic venous end of the stent.  Additionally, there is antegrade flow  in the right portal vein. These findings are nonspecific at one month  post procedure ultrasound exam. Recommend short-term follow-up in 2-3  months interval, which can be obtained earlier if patient's  symptomatology worsens.  2. Cirrhotic liver with splenomegaly and a small amount of free fluid  in the right upper quadrant.  3. Atrophic left kidney.      Assessment/Plan:  Patient is doing very well following TIPS, both clinically and with regards to laboratory results. Minimally elevated velocities on ultrasound are less concerning on a baseline exam. Patient will [facility] with additional f/u US at that time. We discussed the possibility of intervening on the splenic vein if there are further episodes of bleeding, but that at this time the risks likely outweigh the benefits.    Thank you for allowing me to participate in Mr. Whitfield's care today.     I, Dr Gabe Wei, was present with the fellow during the history and exam. I discussed the case with the fellow and agree with the findings as documented in the assessment and plan.    A total of 40 minutes was spent on this clinic visit, more than half was on direct counseling and coordination of the care.    Gabe Wei MD    Vascular and Interventional Radiolgy  [facility]      CC  Patient Care Team:  Kristy Deleon MD as PCP - General  KRISTY DELEON

## 2019-07-01 NOTE — NURSING NOTE
Vascular Rooming Note     Varghese Whitfield's goals for this visit include:   Chief Complaint   Patient presents with     RECHECK     Marbin, is being seen today for a 1 month follow up TIPS, feeling great, much improved, at the end of the day legs are swollen, thighs are tight, questions about water retention (prescription or OTC)? , no other concerns at this time as reported by patient.     Karen Ramos LPN

## 2019-07-01 NOTE — LETTER
7/1/2019       RE: Varghese Whitfield  [address]  [facility] [address]     Dear Colleague,    Thank you for referring your patient, Varghese Whitfield, to the [facility] at [facility]. Please see a copy of my visit note below.      INTERVENTIONAL RADIOLOGY CONSULTATION    Name: Varghese Whitfield  Age: 68 year old   Referring Physician: Dr. Deleon   REASON FOR REFERRAL: Variceal bleeding    HPI: Mr. Whitfield is a pleasant 68 year old male who presented in May from outside hospital due to gastric variceal bleeding in the setting of cirrhosis with portal hypertension and splenic vein thrombosis. Trans-jugular Intrahepatic Portosystemic Shunting was performed as an outpatient near the 1 month ago. Recanalization of the splenic vein was attempted via a trans-jugular approach through the TIPS, but this was not successful. Patient states he has been doing very well since this procedure. No further episodes of bleeding. No changes in mentation. Reports very mild bilateral lower extremity swelling.  Overall both patient and his wife expressed satisfaction with the course of postprocedural episode.    PAST MEDICAL HISTORY:   Past Medical History:   Diagnosis Date     Anemia      Anxiety      Asthma     allergy related     Madden's esophagus      Cirrhosis with alcoholism (H)      HTN (hypertension)      Postherpetic neuralgia      Seizure (H)     Per outside records due to EtOH withdrawal, patient does not remember this      Thrombocytopenia (H)      UGIB (upper gastrointestinal bleed)        PAST SURGICAL HISTORY:   Past Surgical History:   Procedure Laterality Date     AS ESOPHAGOSCOPY, DIAGNOSTIC       HEMORRHOIDECTOMY       IR TRANSVEN INTRAHEPATIC PORTOSYST SHUNT  5/16/2019     SEPTOPLASTY      repaired for deviation     VASECTOMY         FAMILY HISTORY:   Family History   Problem Relation Age of Onset     Other - See Comments Mother         passed away in her sleep     Myocardial  Infarction Father      Alcoholism Father      No Known Problems Sister        SOCIAL HISTORY:   Social History     Tobacco Use     Smoking status: Never Smoker     Smokeless tobacco: Never Used   Substance Use Topics     Alcohol use: Not Currently     Frequency: 4 or more times a week     Drinks per session: 3 or 4       PROBLEM LIST:   Patient Active Problem List    Diagnosis Date Noted     Portal hypertension (H) 05/16/2019     Priority: Medium     GIB (gastrointestinal bleeding) 04/05/2019     Priority: Medium     Seizure (H) 04/15/2012     Priority: Medium     Post herpetic neuralgia 04/15/2012     Priority: Medium     Alcohol dependence (H) 04/15/2012     Priority: Medium     Mild intermittent asthma 04/26/2009     Priority: Medium     Madden's esophagus 04/26/2009     Priority: Medium     Thrombocytopenia (H) 04/26/2009     Priority: Medium     Hypertension 08/13/2002     Priority: Medium     Hyperlipidemia 02/01/2001     Priority: Medium     Allergic rhinitis 04/15/1998     Priority: Medium       MEDICATIONS:   Prescription Medications as of 7/1/2019       Rx Number Disp Refills Start End Last Dispensed Date Next Fill Date Owning Pharmacy    acetaminophen (TYLENOL) 500 MG tablet        [facility]    Sig: Take 1,000 mg by mouth as needed for mild pain    Class: Historical    Route: Oral    albuterol (PROVENTIL HFA) 108 (90 Base) MCG/ACT inhaler        [facility]    Sig: Inhale 2 puffs into the lungs as needed    Class: Historical    Route: Inhalation    Ascorbic Acid (VITAMIN C) 500 MG CAPS        [facility]    Sig: Take 500 mg by mouth every evening    Class: Historical    Route: Oral    cetirizine (ZYRTEC) 10 MG tablet            Sig: Take 10 mg by mouth every morning     Class: Historical    Route: Oral    Glucosamine HCl-MSM (GLUCOSAMINE-MSM PO)        [facility]    Sig: Take 1,500 mg by mouth every evening    Class: Historical    Route:  Oral    hydrOXYzine (VISTARIL) 50 MG capsule            Sig: Take 50 mg by mouth 2 times daily     Class: Historical    Route: Oral    lisinopril-hydrochlorothiazide (PRINZIDE/ZESTORETIC) 10-12.5 MG tablet            Sig: Take 1 tablet by mouth every morning     Class: Historical    Route: Oral    Multiple Vitamin (MULTI VITAMIN W/D-3) TABS            Sig: Take 1 tablet by mouth 2 times daily     Class: Historical    Route: Oral    omeprazole (PRILOSEC) 20 MG DR capsule            Sig: Take 20 mg by mouth 2 times daily     Class: Historical    Route: Oral    propranolol (INDERAL) 10 MG tablet            Sig: Take 10 mg by mouth 2 times daily     Class: Historical    Route: Oral          ALLERGIES:   Penicillins      Physical Examination:   VITALS:   /67 (BP Location: Right arm, Patient Position: Chair, Cuff Size: Adult Regular)   Pulse 60   SpO2 100%   Constitutional: Sitting in chair comfortably, alert and no distress  Head: Normocephalic. No masses, lesions, tenderness or abnormalities  : Deferred  Musculoskeletal: extremities normal- no gross deformities noted, gait normal and normal muscle tone  Skin: no suspicious lesions or rashes  Neurologic: Gait normal. Sensation grossly WNL.  Psychiatric: affect normal/bright and mentation appears normal.  Hematologic/Lymphatic/Immunologic: trace to +1 pitting edema b/l    Labs:    BMP RESULTS:  Lab Results   Component Value Date     07/01/2019    POTASSIUM 3.7 07/01/2019    CHLORIDE 107 07/01/2019    CO2 26 07/01/2019    ANIONGAP 6 07/01/2019     (H) 07/01/2019    BUN 14 07/01/2019    CR 1.03 07/01/2019    GFRESTIMATED 74 07/01/2019    GFRESTBLACK 86 07/01/2019    RADHA 8.7 07/01/2019        CBC RESULTS:  Lab Results   Component Value Date    WBC 3.3 (L) 07/01/2019    RBC 3.67 (L) 07/01/2019    HGB 10.2 (L) 07/01/2019    HCT 32.5 (L) 07/01/2019    MCV 89 07/01/2019    MCH 27.8 07/01/2019    MCHC 31.4 (L) 07/01/2019    RDW 18.1 (H) 07/01/2019    PLT 55  (L) 07/01/2019       INR/PTT:  Lab Results   Component Value Date    INR 1.62 (H) 07/01/2019    PTT 33 05/16/2019       Diagnostic studies:   TIPS US 07/01/19  1. Patent TIPS with slightly elevated velocities in the mid and  hepatic venous end of the stent. Additionally, there is antegrade flow  in the right portal vein. These findings are nonspecific at one month  post procedure ultrasound exam. Recommend short-term follow-up in 2-3  months interval, which can be obtained earlier if patient's  symptomatology worsens.  2. Cirrhotic liver with splenomegaly and a small amount of free fluid  in the right upper quadrant.  3. Atrophic left kidney.      Assessment/Plan:  Patient is doing very well following TIPS, both clinically and with regards to laboratory results. Minimally elevated velocities on ultrasound are less concerning on a baseline exam. Patient will follow-up further with [facility], with additional f/u US at that time. We discussed the possibility of intervening on the splenic vein if there are further episodes of bleeding, but that at this time the risks likely outweigh the benefits.    Thank you for allowing me to participate in Mr. Whitfield's care today.     I, Dr Gabe Wei, was present with the fellow during the history and exam. I discussed the case with the fellow and agree with the findings as documented in the assessment and plan.    A total of 40 minutes was spent on this clinic visit, more than half was on direct counseling and coordination of the care.    Gabe Wei MD    Vascular and Interventional Radiolgy  [facility]      CC  Patient Care Team:  Kristy Deleon MD as PCP - General  KRISTY DELEON

## 2020-03-11 ENCOUNTER — HEALTH MAINTENANCE LETTER (OUTPATIENT)
Age: 70
End: 2020-03-11

## 2021-01-03 ENCOUNTER — HEALTH MAINTENANCE LETTER (OUTPATIENT)
Age: 71
End: 2021-01-03

## 2021-04-25 ENCOUNTER — HEALTH MAINTENANCE LETTER (OUTPATIENT)
Age: 71
End: 2021-04-25

## 2021-10-10 ENCOUNTER — HEALTH MAINTENANCE LETTER (OUTPATIENT)
Age: 71
End: 2021-10-10

## 2022-05-21 ENCOUNTER — HEALTH MAINTENANCE LETTER (OUTPATIENT)
Age: 72
End: 2022-05-21

## 2022-09-18 ENCOUNTER — HEALTH MAINTENANCE LETTER (OUTPATIENT)
Age: 72
End: 2022-09-18

## 2023-06-04 ENCOUNTER — HEALTH MAINTENANCE LETTER (OUTPATIENT)
Age: 73
End: 2023-06-04